# Patient Record
Sex: FEMALE | Race: WHITE | NOT HISPANIC OR LATINO | Employment: FULL TIME | URBAN - NONMETROPOLITAN AREA
[De-identification: names, ages, dates, MRNs, and addresses within clinical notes are randomized per-mention and may not be internally consistent; named-entity substitution may affect disease eponyms.]

---

## 2017-04-12 DIAGNOSIS — I51.7 DILATED VENTRICLE: Primary | ICD-10-CM

## 2017-04-12 DIAGNOSIS — R06.02 SHORTNESS OF BREATH: ICD-10-CM

## 2017-05-22 LAB
BH CV ECHO MEAS - EDV(CUBED): 79.5 ML
BH CV ECHO MEAS - EDV(TEICH): 83.1 ML
BH CV ECHO MEAS - EF(CUBED): 69.3 %
BH CV ECHO MEAS - EF(TEICH): 61.2 %
BH CV ECHO MEAS - ESV(CUBED): 24.4 ML
BH CV ECHO MEAS - ESV(TEICH): 32.2 ML
BH CV ECHO MEAS - FS: 32.6 %
BH CV ECHO MEAS - IVS/LVPW: 1
BH CV ECHO MEAS - IVSD: 0.8 CM
BH CV ECHO MEAS - LA DIMENSION: 3.5 CM
BH CV ECHO MEAS - LV MASS(C)D: 105.3 GRAMS
BH CV ECHO MEAS - LVIDD: 4.3 CM
BH CV ECHO MEAS - LVIDS: 2.9 CM
BH CV ECHO MEAS - LVPWD: 0.8 CM
BH CV ECHO MEAS - PA MAX PG: 4.2 MMHG
BH CV ECHO MEAS - PA V2 MAX: 102 CM/SEC
BH CV ECHO MEAS - PI END-D VEL: 82.5 CM/SEC
BH CV ECHO MEAS - RAP SYSTOLE: 5 MMHG
BH CV ECHO MEAS - RVDD: 2.2 CM
BH CV ECHO MEAS - RVSP: 22.1 MMHG
BH CV ECHO MEAS - SV(CUBED): 55.1 ML
BH CV ECHO MEAS - SV(TEICH): 50.9 ML
BH CV ECHO MEAS - TR MAX VEL: 207 CM/SEC

## 2017-06-02 DIAGNOSIS — R06.00 DYSPNEA, UNSPECIFIED TYPE: Primary | ICD-10-CM

## 2017-06-05 ENCOUNTER — OFFICE VISIT (OUTPATIENT)
Dept: CARDIOLOGY | Facility: CLINIC | Age: 27
End: 2017-06-05

## 2017-06-05 VITALS
WEIGHT: 146 LBS | DIASTOLIC BLOOD PRESSURE: 70 MMHG | BODY MASS INDEX: 29.43 KG/M2 | SYSTOLIC BLOOD PRESSURE: 110 MMHG | HEIGHT: 59 IN | HEART RATE: 82 BPM | OXYGEN SATURATION: 99 %

## 2017-06-05 DIAGNOSIS — Q20.8 ABNORMALITY OF RIGHT VENTRICLE OF HEART: ICD-10-CM

## 2017-06-05 DIAGNOSIS — R00.2 PALPITATIONS: ICD-10-CM

## 2017-06-05 DIAGNOSIS — I95.1 ORTHOSTATIC HYPOTENSION: Primary | ICD-10-CM

## 2017-06-05 PROCEDURE — 99214 OFFICE O/P EST MOD 30 MIN: CPT | Performed by: INTERNAL MEDICINE

## 2017-06-05 NOTE — PROGRESS NOTES
Cardiovascular Medicine   Joby Gamble M.D., Ph.D., [facility]              History of Present Illness        1. CP  A. Low-risk TST  B. TTE with RV dilation  2. Dyspnea  A. RV dilation  3. OH  A. NELA abnormal    Chest pain  She has had a TTE that was essentially normal. She had a low-risk TST. She tells me she is having pin point and focal pain at times that is epigastric. No other symptoms. She also tells me that migraines are often associated with these pains. Often times her pain radiates up into her head. She had an appt with neuro, but cancelled.     RV dilation  The patient was having dyspnea so she was sent for a TTE.  This showed likely severely dilated right ventricle.  She does complain of dyspnea. She underwent a ANNALEE. This showed no abnormalities. A repeat TTE was performed. RV is normal size.  She states she's doing well.    OH  Patient's tilt table was abnormal.  She's been using standardized precautions.  She's really been asymptomatic.  She's had no evidence of syncope.    Review of Systems - History obtained from chart review and the patient  General ROS: negative  Cardiovascular ROS: no chest pain or dyspnea on exertion    ROS     reports that she has never smoked. She has never used smokeless tobacco. She reports that she drinks alcohol. She reports that she does not use illicit drugs.    No Known Allergies        Physical Exam:  Vitals:    06/05/17 1447   BP: 110/70   Pulse: 82   SpO2: 99%     Body mass index is 29.49 kg/(m^2).    GEN: alert, appears stated age and cooperative  Body Habitus: obese  HEENT: Head: Normocephalic, no lesions, without obvious abnormality.  Neck / Thyroid: Supple, no masses, nodes, nodules or enlargement. No arcus senilis, xanthelasma or xanthomas.   JVP: 6 cm of water at 45 degrees HJR:      Carotid:  Upstroke: Brisk.  Volume: Normal.    Carotid Bruit:  None  Subclavian Bruit: Not present.    Chest:  Normal Excursion: Normal.  I:E: Normal  Pulmonary:clear to  auscultation, no wheezes, rales or rhonchi, symmetric air entry      Precordium:  No palpable heaves or thrusts. P2 is not palpable.   Erie:  normal size and placement Palpable S4: Not present.   Heart rate: normal  Heart Rhythm: regular     Heart Sounds: S1: normal intensity  S2: normal intensity  S3: absent   S4: absent  Opening Snap: absent  A2-OS:  N/A  Pericardial rub: absent    Ejection click: None      Murmurs: Systolic: Absent  Diastolic: absent  Extremity: no edema, cyanosis  Trophic changes: None   Pallor on elevation: Absent.    Rubor on dependency: None  Pulses: Right radial artery has 2+ (normal) pulse and Left radial artery has 2+ (normal) pulse    DATA REVIEWED:   EKG: Sinus  TST, low-risk  Holter, 2016  1. Numerous episodes of artifact. Recommend repeat study. 2. Sinus mechanism with normal average heart rate. 3. Normal burden of supraventricular ectopic events. 4. Symptoms correlated only with sinus mechanism.     TTE, 2016  1. Normal left ventricular function with an estimated ejection fraction of 65% without regional wall motion abnormalities. 2. Likely severely dilated right ventricle with impaired systolic function. 3. Normal diastolic function. 4. No significant valvular regurgitation or stenosis.     [facility] HEART AND VASCULAR  [facility] [address] [place] [address] [phone]             Irene Collins   Acquired echocardiogram 2D limited doppler, and color flow   Order# [id number]   Reading physician: Joby Gamble MD PhD Ordering physician: Joby Gamble MD PhD Study date: 17   Patient Information   Patient Name MRN Sex  (Age)   Irene Collins [id number] Female 1990 (27 y.o.)   Sedation Narrator Report   Sedation Narrator Report   Interpretation Summary   · Limited 2D echocardiogram  · Left Ventricle: Estimated EF appears to be in the range of 61 - 65%.  · Right Ventricle: Normal right ventricular cavity size, wall  thickness, systolic function and septal motion noted  · No evidence of pulmonary hypertension is present.  · There is no evidence of pericardial effusion.       Assessment/Plan     1. OH:  Because of the patient's postural hypotension and orthostasis, the following information was provided :    *Avoid standing for long periods. If you must stand, make sure that you keep your feet moving to help increase circulation  *Get up slowly from either sitting or lying down (this is especially important when you are getting out of the bath)  *Eat regularly. Avoid long periods between meals; it is better to snack throughout the day  *Avoid hot baths or showers  *Wear loose, comfortable clothing to avoid restricting circulation    If the patient were to feel faint, the following was provided:  sit or lie down and lower your head  take deep breaths  loosen any tight clothing  open windows and move towards circulating air  eat foods rich in iron    2.  Increased cardiac awareness without arrhythmia.  Reassurance    3. Non-cardiac CP.   -Follow-up PCP    4. RV dilation. ANNALEE and repeat TTE showed normal dimensions.  Reassurance    5.  Cardiac risk assessment: Low  No current indication for aspirin or statin    6.  Tobacco status: Patient's a nonsmoker    7. Possible KAY. She had a sleep study in [place]. She has not followed up.   -Recommended she see Dr. Aj      Follow-up: PCP

## 2017-07-21 ENCOUNTER — OFFICE VISIT (OUTPATIENT)
Dept: FAMILY MEDICINE CLINIC | Facility: CLINIC | Age: 27
End: 2017-07-21

## 2017-07-21 ENCOUNTER — APPOINTMENT (OUTPATIENT)
Dept: LAB | Facility: HOSPITAL | Age: 27
End: 2017-07-21

## 2017-07-21 VITALS
WEIGHT: 139 LBS | HEIGHT: 59 IN | BODY MASS INDEX: 28.02 KG/M2 | SYSTOLIC BLOOD PRESSURE: 130 MMHG | DIASTOLIC BLOOD PRESSURE: 80 MMHG | OXYGEN SATURATION: 99 % | HEART RATE: 104 BPM | TEMPERATURE: 99 F

## 2017-07-21 DIAGNOSIS — R30.0 DYSURIA: Primary | ICD-10-CM

## 2017-07-21 DIAGNOSIS — B37.2 CUTANEOUS CANDIDIASIS: ICD-10-CM

## 2017-07-21 LAB
BILIRUB BLD-MCNC: ABNORMAL MG/DL
CLARITY, POC: ABNORMAL
COLOR UR: ABNORMAL
GLUCOSE UR STRIP-MCNC: NEGATIVE MG/DL
KETONES UR QL: ABNORMAL
LEUKOCYTE EST, POC: NEGATIVE
NITRITE UR-MCNC: NEGATIVE MG/ML
PH UR: 5 [PH] (ref 5–8)
PROT UR STRIP-MCNC: ABNORMAL MG/DL
RBC # UR STRIP: ABNORMAL /UL
SP GR UR: 1.03 (ref 1–1.03)
UROBILINOGEN UR QL: NORMAL

## 2017-07-21 PROCEDURE — 81002 URINALYSIS NONAUTO W/O SCOPE: CPT | Performed by: FAMILY MEDICINE

## 2017-07-21 PROCEDURE — 99213 OFFICE O/P EST LOW 20 MIN: CPT | Performed by: FAMILY MEDICINE

## 2017-07-21 PROCEDURE — 87086 URINE CULTURE/COLONY COUNT: CPT | Performed by: FAMILY MEDICINE

## 2017-07-21 RX ORDER — SULFAMETHOXAZOLE AND TRIMETHOPRIM 800; 160 MG/1; MG/1
1 TABLET ORAL 2 TIMES DAILY
Qty: 6 TABLET | Refills: 0 | Status: SHIPPED | OUTPATIENT
Start: 2017-07-21 | End: 2017-09-29

## 2017-07-21 RX ORDER — PHENAZOPYRIDINE HYDROCHLORIDE 200 MG/1
200 TABLET, FILM COATED ORAL 3 TIMES DAILY PRN
Qty: 6 TABLET | Refills: 0 | Status: SHIPPED | OUTPATIENT
Start: 2017-07-21 | End: 2017-09-29

## 2017-07-21 RX ORDER — NYSTATIN 100000 [USP'U]/G
POWDER TOPICAL 3 TIMES DAILY
Qty: 30 G | Refills: 1 | Status: SHIPPED | OUTPATIENT
Start: 2017-07-21 | End: 2017-09-29

## 2017-07-21 NOTE — PROGRESS NOTES
Subjective:     WALK-IN    Chief Complaint:   Chief Complaint   Patient presents with   • Fever     place on bottom   • Headache     Irene Collins is a 27 y.o. female former patient of Dr Granados who presents with fever and a rash on her buttocks.  She had temperature of 102.5 yesterday morning at 9:00 am, took Tylenol x 4, then had subjective fever last night and took 2 Tylenol's.  She did not check her temperature today, but feels slightly warm.  She has also had symptoms or dysuria since  (), specifically pain and burning with urination, as well as frequency.  Denies flank/back pain.  With regards to the rash, she has had it since .  [place] and noticed it after that.  She has not noticed any bleeding or purulent drainage.  Mild headache on Monday, has been asymptomatic since.    Past Medical Hx:  Past Medical History:   Diagnosis Date   • Adjustment disorder with depressed mood     Adjustment disorder with depressed mood - on celexa and 1 week risperdal   • Costal chondritis    • Cough     Cough - improved   • Hand eczema    • Influenza    • Not vaccinated against influenza 2016    INFLUENZA IMMUN NOT ADMIN, REASON NOT DOC  (1) - CHERI GRANADOS ([facility])    • Rib pain    • Stress     Difficulty managing stress   • Upper respiratory infection        Past Surgical Hx:  Past Surgical History:   Procedure Laterality Date   •  SECTION       Section (2)   • CHOLECYSTECTOMY      Cholecystectomy (1)   • CHOLECYSTECTOMY      Cholecystectomy, laparoscopic (1)   • CYSTOSCOPY      Cystoscopy (1)   • CYSTOSCOPY      Cystoscopy & treatment (1)   • DENTAL PROCEDURE      Dental surgery procedure (1) - wisdom teeth       Health Maintenance:  Health Maintenance   Topic Date Due   • TDAP/TD VACCINES (1 - Tdap) 2017 (Originally 2009)   • INFLUENZA VACCINE  2017   • PAP SMEAR  2019       Current Meds:    Current Outpatient  Prescriptions on File Prior to Visit   Medication Sig   • levonorgestrel (MIRENA, 52 MG,) 20 MCG/24HR IUD 1 each by Intrauterine route 1 (one) time.   • B Complex-C (SUPER B COMPLEX PO) Take  by mouth.     No current facility-administered medications on file prior to visit.      Allergies:  Review of patient's allergies indicates no known allergies.    Family Hx:  Family History   Problem Relation Age of Onset   • ADD / ADHD Mother    • Diabetes Mother    • Anxiety disorder Mother    • ADD / ADHD Brother    • Heart murmur Brother    • Diabetes Maternal Grandmother    • Depression Other      Depressive disorder   • Heart disease Other    • Retinitis pigmentosa Other    • Arrhythmia Other    • Breast cancer Neg Hx    • Colon cancer Neg Hx      Colorectal cancer   • Endometrial cancer Neg Hx    • Ovarian cancer Neg Hx         Social History:  Social History     Social History   • Marital status:      Spouse name: N/A   • Number of children: N/A   • Years of education: N/A     Occupational History   • Not on file.     Social History Main Topics   • Smoking status: Never Smoker   • Smokeless tobacco: Never Used   • Alcohol use Yes      Comment: rare   • Drug use: No   • Sexual activity: Defer      Comment:      Other Topics Concern   • Not on file     Social History Narrative       Review of Systems  Review of Systems   Constitutional: Negative for chills and fever.   HENT: Negative for facial swelling and tinnitus.    Eyes: Negative for discharge and itching.   Respiratory: Negative for apnea and chest tightness.    Cardiovascular: Negative for chest pain and palpitations.   Gastrointestinal: Negative for abdominal distention and abdominal pain.   Endocrine: Negative for cold intolerance and heat intolerance.   Genitourinary: Positive for dysuria and frequency. Negative for difficulty urinating and vaginal discharge.   Musculoskeletal: Negative for arthralgias and back pain.   Skin: Positive for color  "change. Negative for pallor.   Neurological: Positive for headaches. Negative for syncope and speech difficulty.   Psychiatric/Behavioral: Negative for agitation and self-injury.     Objective:     /80  Pulse 104  Temp 99 °F (37.2 °C)  Ht 59\" (149.9 cm)  Wt 139 lb (63 kg)  SpO2 99%  BMI 28.07 kg/m2    General:  alert, appears stated age and cooperative   Oropharynx: lips, mucosa, and tongue normal; teeth and gums normal    Eyes:  conjunctivae/corneas clear. PERRL, EOM's intact. Fundi benign.    Ears:  normal TM's and external ear canals both ears   Neck: no adenopathy, no carotid bruit, no JVD, supple, symmetrical, trachea midline and thyroid not enlarged, symmetric, no tenderness/mass/nodules   Thyroid:  no palpable nodule   Lung: clear to auscultation bilaterally   Heart:  regular rate and rhythm, S1, S2 normal, no murmur, click, rub or gallop   Abdomen: soft, non-tender; bowel sounds normal; no masses,  no organomegaly   Extremities: extremities normal, atraumatic, no cyanosis or edema   Skin: Approx 4 x 4 cm erythematous macular rash on R medial buttock.  No firmness noted.   Pulses: 2+ and symmetric   Neuro: normal without focal findings, mental status, speech normal, alert and oriented x3     Office Visit on 07/21/2017   Component Date Value Ref Range Status   • Color 07/21/2017 Dark Yellow  Yellow, Straw, Dark Yellow, Nadja Final   • Clarity, UA 07/21/2017 Cloudy* Clear Final   • Glucose, UA 07/21/2017 Negative  Negative, 1000 mg/dL (3+) mg/dL Final   • Bilirubin 07/21/2017 Large (3+)* Negative Final   • Ketones, UA 07/21/2017 Trace* Negative Final   • Specific Gravity  07/21/2017 1.030  1.005 - 1.030 Final   • Blood, UA 07/21/2017 Large* Negative Final   • pH, Urine 07/21/2017 5.0  5.0 - 8.0 Final   • Protein, POC 07/21/2017 2+* Negative mg/dL Final   • Urobilinogen, UA 07/21/2017 Normal  Normal Final   • Leukocytes 07/21/2017 Negative  Negative Final   • Nitrite, UA 07/21/2017 Negative  Negative " Final   • Urine Culture 07/21/2017 No growth at 24 hours   Final        Assessment/Plan:     Allegra was seen today for fever and headache.    Diagnoses and all orders for this visit:    Dysuria  -     POCT urinalysis dipstick, manual  -     Follow-up Urine Culture  -     sulfamethoxazole-trimethoprim (BACTRIM DS) 800-160 MG per tablet; Take 1 tablet by mouth 2 (Two) Times a Day.  -     phenazopyridine (PYRIDIUM) 200 MG tablet; Take 1 tablet by mouth 3 (Three) Times a Day As Needed for bladder spasms.        - Afebrile here, discussed fever curve and that she will likely continue to improve, but if worsens over the weekend she should seek medical attention.    Cutaneous candidiasis  -     nystatin (MYCOSTATIN) 514504 UNIT/GM powder; Apply  topically 3 (Three) Times a Day.    Return in about 1 month (around 8/21/2017) for Recheck, establish care Dr Puri.  Advised that she can follow-up with me if desired, but she would rather see Hien Puri MD due to longer continuity of care.    Preventative:  Tdap deferred due to acute illness.  Pap discussed, patient would [facility] to [facility], she had Mirena placed last year by [facility], and she was supposed to follow-up in 1 year.  Advised to make appointment with them.    RISK SCORE: 4          This document has been electronically signed by Chito Milligan MD on July 23, 2017 1:41 PM

## 2017-07-23 LAB — BACTERIA SPEC AEROBE CULT: NORMAL

## 2017-07-24 NOTE — PROGRESS NOTES
I have reviewed the notes, assessments, and/or procedures performed by Dr. Milligan, I concur with her/his documentation of Irene Collins.

## 2017-09-29 ENCOUNTER — APPOINTMENT (OUTPATIENT)
Dept: LAB | Facility: HOSPITAL | Age: 27
End: 2017-09-29

## 2017-09-29 ENCOUNTER — PROCEDURE VISIT (OUTPATIENT)
Dept: OBSTETRICS AND GYNECOLOGY | Facility: CLINIC | Age: 27
End: 2017-09-29

## 2017-09-29 VITALS
SYSTOLIC BLOOD PRESSURE: 122 MMHG | BODY MASS INDEX: 29.64 KG/M2 | WEIGHT: 147 LBS | DIASTOLIC BLOOD PRESSURE: 82 MMHG | HEIGHT: 59 IN

## 2017-09-29 DIAGNOSIS — Z11.3 ENCOUNTER FOR SCREENING FOR INFECTIONS WITH PREDOMINANTLY SEXUAL MODE OF TRANSMISSION: Primary | ICD-10-CM

## 2017-09-29 DIAGNOSIS — Z30.431 IUD CHECK UP: ICD-10-CM

## 2017-09-29 DIAGNOSIS — Z32.00 PREGNANCY EXAMINATION OR TEST, PREGNANCY UNCONFIRMED: ICD-10-CM

## 2017-09-29 LAB
CANDIDA ALBICANS: NEGATIVE
GARDNERELLA VAGINALIS: NEGATIVE
HCG INTACT+B SERPL-ACNC: <2.4 MIU/ML
TRICHOMONAS VAGINALIS PCR: NEGATIVE

## 2017-09-29 PROCEDURE — 87660 TRICHOMONAS VAGIN DIR PROBE: CPT | Performed by: NURSE PRACTITIONER

## 2017-09-29 PROCEDURE — 87491 CHLMYD TRACH DNA AMP PROBE: CPT | Performed by: NURSE PRACTITIONER

## 2017-09-29 PROCEDURE — 87591 N.GONORRHOEAE DNA AMP PROB: CPT | Performed by: NURSE PRACTITIONER

## 2017-09-29 PROCEDURE — 36415 COLL VENOUS BLD VENIPUNCTURE: CPT | Performed by: NURSE PRACTITIONER

## 2017-09-29 PROCEDURE — 99213 OFFICE O/P EST LOW 20 MIN: CPT | Performed by: NURSE PRACTITIONER

## 2017-09-29 PROCEDURE — 87510 GARDNER VAG DNA DIR PROBE: CPT | Performed by: NURSE PRACTITIONER

## 2017-09-29 PROCEDURE — 84702 CHORIONIC GONADOTROPIN TEST: CPT | Performed by: NURSE PRACTITIONER

## 2017-09-29 PROCEDURE — 87480 CANDIDA DNA DIR PROBE: CPT | Performed by: NURSE PRACTITIONER

## 2017-09-29 NOTE — PROGRESS NOTES
"Subjective   History of Present Illness    Irene Collins is a 27 y.o. female who presents for IUD check, STI testing, and pregnancy test. She does not feel her strings at home. She has had fatigue, nausea, and headaches recently, and concerned that she may be pregnant. She has had current mirena IUD since 3/18/14. She has had 1 new sexual partner and not aware of his sexual history.    Current contraception: Mirena IUD  Sexually active?: Yes  Postmenopausal?: No  HRT?: no  Hysterectomy?: no    Date last pap:   History of abnormal Pap smear: no    /82  Ht 59\" (149.9 cm)  Wt 147 lb (66.7 kg)  LMP 2009 (Approximate)  Breastfeeding? No  BMI 29.69 kg/m2    Past Medical History:   Diagnosis Date   • Adjustment disorder with depressed mood     Adjustment disorder with depressed mood - on celexa and 1 week risperdal   • Costal chondritis    • Cough     Cough - improved   • Hand eczema    • Influenza    • Not vaccinated against influenza 2016    INFLUENZA IMMUN NOT ADMIN, REASON NOT DOC  (1) - CHERI GRANADOS ([facility])    • Rib pain    • Stress     Difficulty managing stress   • Upper respiratory infection        Past Surgical History:   Procedure Laterality Date   •  SECTION       Section (2)   • CHOLECYSTECTOMY      Cholecystectomy (1)   • CHOLECYSTECTOMY      Cholecystectomy, laparoscopic (1)   • CYSTOSCOPY      Cystoscopy (1)   • CYSTOSCOPY      Cystoscopy & treatment (1)   • DENTAL PROCEDURE      Dental surgery procedure (1) - wisdom teeth       The following portions of the patient's history were reviewed and updated as appropriate: allergies, current medications, past family history, past medical history, past social history, past surgical history and problem list.    Review of Systems    Constitutional:  No fatigue, No weight loss, No weight gain, No fever and No chills   Respiratory: No dyspnea, No cough, No hemopytysis, No wheezing and No pleuritic pain   Cardiovascular: " No chest pain, No palpitations, No arrhythmia, No orthopnea, No noctural dyspnea, No edema and No claudication   Breasts: No discharge from nipple, No breast tenderness and No breast mass   Gastrointestinal: No loss of appetite, No dysphagia, No abdominal pain, No nausea, No vomiting, No change in bowel habits, No diarrhea, No constipation and No blood in stool   Genitourinary: No increased frequency of urination, No dysuria, No hematuria, No nocturia, No urinary incontinence, No vaginal discharge, No abnormal vaginal bleeding, No pelvic pain, No menstrual problem and No menopausal problem   Skin: No skin rash, No skin lesion, No dry skin, No pruritus and No nail problem   Neurologic: No headache, No dizziness, No lightheadedness, No syncope, No vertigo, No weakness, No numbness, No tremor and No paresthesia   Psychiatric: No difficulty sleeping, No mood swings, No feeling anxious, No confusion and No memory loss          Objective   Physical Exam    General:  Alert and oriented x 3, Cooperative, Well developed & well nourished and No acute distress   Genitourinary: Vulva normal, vaginal mucosa normal, bladder nondistended and nontender, cervix normal, uterus normal size and nontender, no adnexal/pelvic tenderness or masses, Bartholin's/Tangier/Urethral gland WNL, IUD strings noted extruding from os   Skin: Skin warm and dry and Pattern of hair growth normal       Assessment/Plan   Allegra was seen today for fatigue.    Diagnoses and all orders for this visit:    Encounter for screening for infections with predominantly sexual mode of transmission  -     Chlamydia trachomatis, Neisseria gonorrhoeae, PCR - Swab, Vagina  -     Gardnerella vaginalis, Trichomonas vaginalis, Candida albicans, PCR - Swab, Vagina    Pregnancy examination or test, pregnancy unconfirmed  -     hCG, Quantitative, Pregnancy    IUD check up      All questions answered.  Labs today.  Will rule out vaginal infections.  Discussed contraception  methods, including risks/side effects/benefits.  Follow up in 1 year.

## 2017-10-01 LAB
C TRACH RRNA CVX QL NAA+PROBE: NOT DETECTED
N GONORRHOEA RRNA SPEC QL NAA+PROBE: DETECTED

## 2017-10-02 ENCOUNTER — TELEPHONE (OUTPATIENT)
Dept: OBSTETRICS AND GYNECOLOGY | Facility: CLINIC | Age: 27
End: 2017-10-02

## 2017-10-02 NOTE — TELEPHONE ENCOUNTER
Informed pt of +gonorrhea over phone. Patient to return to office for rocephin injection. Advised pt to have partner treated by PCP/health department.

## 2017-10-03 ENCOUNTER — CLINICAL SUPPORT (OUTPATIENT)
Dept: OBSTETRICS AND GYNECOLOGY | Facility: CLINIC | Age: 27
End: 2017-10-03

## 2017-10-03 DIAGNOSIS — A54.9 GONORRHEA: Primary | ICD-10-CM

## 2017-10-03 PROCEDURE — 96372 THER/PROPH/DIAG INJ SC/IM: CPT | Performed by: NURSE PRACTITIONER

## 2017-10-03 RX ORDER — CEFTRIAXONE SODIUM 250 MG/1
250 INJECTION, POWDER, FOR SOLUTION INTRAMUSCULAR; INTRAVENOUS EVERY 24 HOURS
Status: DISCONTINUED | OUTPATIENT
Start: 2017-10-03 | End: 2018-02-12

## 2017-10-03 RX ADMIN — CEFTRIAXONE SODIUM 250 MG: 250 INJECTION, POWDER, FOR SOLUTION INTRAMUSCULAR; INTRAVENOUS at 09:21

## 2017-12-21 PROCEDURE — 88142 CYTOPATH C/V THIN LAYER: CPT | Performed by: NURSE PRACTITIONER

## 2017-12-21 PROCEDURE — 88141 CYTOPATH C/V INTERPRET: CPT | Performed by: PATHOLOGY

## 2017-12-21 PROCEDURE — 87624 HPV HI-RISK TYP POOLED RSLT: CPT | Performed by: NURSE PRACTITIONER

## 2017-12-26 ENCOUNTER — LAB REQUISITION (OUTPATIENT)
Dept: LAB | Facility: HOSPITAL | Age: 27
End: 2017-12-26

## 2017-12-26 DIAGNOSIS — Z01.419 ENCOUNTER FOR GYNECOLOGICAL EXAMINATION WITHOUT ABNORMAL FINDING: ICD-10-CM

## 2017-12-28 LAB
LAB AP CASE REPORT: ABNORMAL
LAB AP GYN ADDITIONAL INFORMATION: ABNORMAL
LAB AP GYN OTHER FINDINGS: ABNORMAL
LAB AP LMP: ABNORMAL
Lab: ABNORMAL
PATH INTERP SPEC-IMP: ABNORMAL
STAT OF ADQ CVX/VAG CYTO-IMP: ABNORMAL

## 2017-12-31 LAB — HPV I/H RISK 4 DNA CVX QL PROBE+SIG AMP: NEGATIVE

## 2018-01-29 ENCOUNTER — LAB REQUISITION (OUTPATIENT)
Dept: LAB | Facility: HOSPITAL | Age: 28
End: 2018-01-29

## 2018-01-29 DIAGNOSIS — Z01.419 ENCOUNTER FOR GYNECOLOGICAL EXAMINATION WITHOUT ABNORMAL FINDING: ICD-10-CM

## 2019-01-07 PROCEDURE — G0123 SCREEN CERV/VAG THIN LAYER: HCPCS | Performed by: NURSE PRACTITIONER

## 2019-01-07 PROCEDURE — 88141 CYTOPATH C/V INTERPRET: CPT | Performed by: PATHOLOGY

## 2019-01-09 ENCOUNTER — LAB REQUISITION (OUTPATIENT)
Dept: LAB | Facility: HOSPITAL | Age: 29
End: 2019-01-09

## 2019-01-09 DIAGNOSIS — Z01.419 ENCOUNTER FOR GYNECOLOGICAL EXAMINATION WITHOUT ABNORMAL FINDING: ICD-10-CM

## 2019-01-10 LAB
GEN CATEG CVX/VAG CYTO-IMP: NORMAL
LAB AP CASE REPORT: NORMAL
LAB AP GYN ADDITIONAL INFORMATION: NORMAL
LAB AP GYN OTHER FINDINGS: NORMAL
LAB AP LMP: NORMAL
PATH INTERP SPEC-IMP: NORMAL
STAT OF ADQ CVX/VAG CYTO-IMP: NORMAL

## 2019-03-19 ENCOUNTER — HOSPITAL ENCOUNTER (EMERGENCY)
Facility: HOSPITAL | Age: 29
Discharge: HOME | End: 2019-03-19
Attending: EMERGENCY MEDICINE | Admitting: EMERGENCY MEDICINE

## 2019-03-19 ENCOUNTER — APPOINTMENT (OUTPATIENT)
Dept: GENERAL RADIOLOGY | Facility: HOSPITAL | Age: 29
End: 2019-03-19

## 2019-03-19 VITALS
WEIGHT: 175 LBS | DIASTOLIC BLOOD PRESSURE: 102 MMHG | HEIGHT: 59 IN | SYSTOLIC BLOOD PRESSURE: 153 MMHG | HEART RATE: 112 BPM | OXYGEN SATURATION: 98 % | RESPIRATION RATE: 18 BRPM | TEMPERATURE: 97.5 F | BODY MASS INDEX: 35.28 KG/M2

## 2019-03-19 DIAGNOSIS — J06.9 URI, ACUTE: ICD-10-CM

## 2019-03-19 DIAGNOSIS — J40 BRONCHITIS: Primary | ICD-10-CM

## 2019-03-19 DIAGNOSIS — R05.9 COUGH: ICD-10-CM

## 2019-03-19 LAB
FLUAV AG NPH QL: NEGATIVE
FLUBV AG NPH QL IA: NEGATIVE

## 2019-03-19 PROCEDURE — 99283 EMERGENCY DEPT VISIT LOW MDM: CPT

## 2019-03-19 PROCEDURE — 87804 INFLUENZA ASSAY W/OPTIC: CPT | Performed by: EMERGENCY MEDICINE

## 2019-03-19 PROCEDURE — 71046 X-RAY EXAM CHEST 2 VIEWS: CPT

## 2019-03-19 RX ORDER — ALBUTEROL SULFATE 90 UG/1
2 AEROSOL, METERED RESPIRATORY (INHALATION) EVERY 6 HOURS PRN
Qty: 1 INHALER | Refills: 0 | Status: SHIPPED | OUTPATIENT
Start: 2019-03-19 | End: 2019-07-30

## 2019-03-19 RX ORDER — PREDNISONE 20 MG/1
20 TABLET ORAL 2 TIMES DAILY
Qty: 10 TABLET | Refills: 0 | Status: SHIPPED | OUTPATIENT
Start: 2019-03-19 | End: 2019-03-24

## 2019-03-19 NOTE — ED PROVIDER NOTES
Subjective   Patient presents to emergency department for cough, congestion, runny nose x 1.5 weeks.  Denies smoking tobacco.  But her boyfriend smoke in the house.  States son has had an intermittent cough x 2 months but seems like it has gotten better. States he has had a cough for a few months.  Her boyfriend is also sick with similar symptoms.  States she was diagnosed with bronchitis and given cough syrup, and steroid, and an antibiotic by urgent care.  Also states she has thrombocytosis.          History provided by:  Patient   used: No    Flu Symptoms   Presenting symptoms: cough, fatigue, fever and rhinorrhea    Presenting symptoms: no diarrhea, no headaches, no nausea, no shortness of breath, no sore throat and no vomiting    Severity:  Moderate  Onset quality:  Sudden  Duration:  10 days  Progression:  Worsening  Chronicity:  New  Associated symptoms: chills and nasal congestion    Risk factors: sick contacts    Risk factors: no immunocompromised state        Review of Systems   Constitutional: Positive for chills, fatigue and fever.   HENT: Positive for congestion and rhinorrhea. Negative for sore throat and trouble swallowing.    Eyes: Negative for visual disturbance.   Respiratory: Positive for cough and wheezing. Negative for shortness of breath.    Cardiovascular: Positive for chest pain (when she coughs).   Gastrointestinal: Negative for abdominal pain, diarrhea, nausea and vomiting.   Genitourinary: Negative for dysuria and flank pain.   Skin: Negative for color change.   Allergic/Immunologic: Negative for immunocompromised state.   Neurological: Negative for headaches.   Hematological: Does not bruise/bleed easily.   Psychiatric/Behavioral: Negative for confusion.       Past Medical History:   Diagnosis Date   • Adjustment disorder with depressed mood     Adjustment disorder with depressed mood - on celexa and 1 week risperdal   • Costal chondritis    • Cough     Cough -  "improved   • Hand eczema    • Influenza    • Not vaccinated against influenza 2016    INFLUENZA IMMUN NOT ADMIN, REASON NOT DOC  (1) - CHERI GRANADOS ([facility])    • Rib pain    • Stress     Difficulty managing stress   • Upper respiratory infection        No Known Allergies    Past Surgical History:   Procedure Laterality Date   •  SECTION       Section (2)   • CHOLECYSTECTOMY      Cholecystectomy (1)   • CHOLECYSTECTOMY      Cholecystectomy, laparoscopic (1)   • CYSTOSCOPY      Cystoscopy (1)   • CYSTOSCOPY      Cystoscopy & treatment (1)   • DENTAL PROCEDURE      Dental surgery procedure (1) - wisdom teeth       Family History   Problem Relation Age of Onset   • ADD / ADHD Mother    • Diabetes Mother    • Anxiety disorder Mother    • ADD / ADHD Brother    • Heart murmur Brother    • Diabetes Maternal Grandmother    • Depression Other         Depressive disorder   • Heart disease Other    • Retinitis pigmentosa Other    • Arrhythmia Other    • Breast cancer Neg Hx    • Colon cancer Neg Hx         Colorectal cancer   • Endometrial cancer Neg Hx    • Ovarian cancer Neg Hx        Social History     Socioeconomic History   • Marital status:      Spouse name: Not on file   • Number of children: Not on file   • Years of education: Not on file   • Highest education level: Not on file   Tobacco Use   • Smoking status: Never Smoker   • Smokeless tobacco: Never Used   Substance and Sexual Activity   • Alcohol use: Yes     Comment: rare   • Drug use: No   • Sexual activity: Yes     Partners: Male     Birth control/protection: Implant     Comment:    Social History Narrative    ** Merged History Encounter **                Objective      BP (!) 153/102 (Patient Position: Sitting)   Pulse 112   Temp 97.5 °F (36.4 °C) (Oral)   Resp 18   Ht 149.9 cm (59\")   Wt 79.4 kg (175 lb)   SpO2 98%   BMI 35.35 kg/m²     Physical Exam   Constitutional: She is oriented to person, place, and time. She " appears well-developed and well-nourished. No distress.   HENT:   Head: Normocephalic and atraumatic.   Eyes: Conjunctivae are normal.   Cardiovascular: Regular rhythm, normal heart sounds and intact distal pulses.   Tachycardia     Pulmonary/Chest: Effort normal. No stridor. No respiratory distress. She has wheezes.   Persistent cough     Musculoskeletal: She exhibits no edema.   Neurological: She is alert and oriented to person, place, and time.   Skin: Skin is warm. Capillary refill takes less than 2 seconds.   Psychiatric: She has a normal mood and affect. Her behavior is normal. Thought content normal.   Nursing note and vitals reviewed.      Procedures           ED Course  ED Course as of Mar 19 1237   Tue Mar 19, 2019   1127 Discussed with patient that her symptoms are likely due to bronchitis.  Patient has antibiotic steroid and cough syrup.  Discussed likely course of symptoms and supportive treatment.  Advised patient that boyfriend should smoke outside to reduce worsening symptoms.  [BETHANY]      ED Course User Index  [BETHANY] Dustin Parikh PA-C      Results for orders placed or performed during the hospital encounter of 03/19/19   Influenza Antigen, Rapid - Swab, Nasopharynx   Result Value Ref Range    Influenza A Ag, EIA Negative Negative    Influenza B Ag, EIA Negative Negative     Xr Chest Pa & Lateral    Result Date: 3/19/2019  Narrative: Chest PA and lateral CLINICAL INDICATION: Shortness of breath. Cough COMPARISON: October 16, 2012. FINDINGS: Cardiac silhouette is normal in size. Pulmonary vascularity is unremarkable. No focal infiltrate or consolidation.  No pleural effusion.  No pneumothorax.     Impression: CONCLUSION: No evidence of active disease. Normal chest.  Electronically signed by:  Yong Barker MD  3/19/2019 11:11 AM CDT Workstation: [id number]      Discussed results with patient.  Gave educational materials.  Advised close follow up with PCP.  Return to emergency department for new or  worsening symptoms.          [facility]      Final diagnoses:   Bronchitis            Dustin Parikh, ERYN  03/19/19 1339

## 2019-04-09 ENCOUNTER — HOSPITAL ENCOUNTER (OUTPATIENT)
Dept: ULTRASOUND IMAGING | Facility: HOSPITAL | Age: 29
Discharge: HOME | End: 2019-04-09
Admitting: NURSE PRACTITIONER

## 2019-04-09 DIAGNOSIS — K76.0 FATTY LIVER: ICD-10-CM

## 2019-04-09 PROCEDURE — 76705 ECHO EXAM OF ABDOMEN: CPT

## 2019-05-01 ENCOUNTER — APPOINTMENT (OUTPATIENT)
Dept: GENERAL RADIOLOGY | Facility: HOSPITAL | Age: 29
End: 2019-05-01

## 2019-05-01 ENCOUNTER — HOSPITAL ENCOUNTER (EMERGENCY)
Facility: HOSPITAL | Age: 29
Discharge: HOME | End: 2019-05-01
Attending: EMERGENCY MEDICINE | Admitting: EMERGENCY MEDICINE

## 2019-05-01 VITALS
OXYGEN SATURATION: 95 % | DIASTOLIC BLOOD PRESSURE: 89 MMHG | SYSTOLIC BLOOD PRESSURE: 137 MMHG | TEMPERATURE: 98 F | HEART RATE: 86 BPM | RESPIRATION RATE: 20 BRPM

## 2019-05-01 DIAGNOSIS — B00.1 HERPES LABIALIS: Primary | ICD-10-CM

## 2019-05-01 DIAGNOSIS — J06.9 URI, ACUTE: ICD-10-CM

## 2019-05-01 DIAGNOSIS — R05.9 COUGH: ICD-10-CM

## 2019-05-01 PROCEDURE — 71046 X-RAY EXAM CHEST 2 VIEWS: CPT

## 2019-05-01 PROCEDURE — 99283 EMERGENCY DEPT VISIT LOW MDM: CPT

## 2019-05-01 PROCEDURE — 63710000001 PREDNISONE PER 1 MG: Performed by: PHYSICIAN ASSISTANT

## 2019-05-01 RX ORDER — VALACYCLOVIR HYDROCHLORIDE 1 G/1
2000 TABLET, FILM COATED ORAL 2 TIMES DAILY
Qty: 4 TABLET | Refills: 0 | Status: SHIPPED | OUTPATIENT
Start: 2019-05-01 | End: 2019-05-02

## 2019-05-01 RX ORDER — PREDNISONE 20 MG/1
20 TABLET ORAL ONCE
Status: COMPLETED | OUTPATIENT
Start: 2019-05-01 | End: 2019-05-01

## 2019-05-01 RX ORDER — PREDNISONE 20 MG/1
20 TABLET ORAL DAILY
Qty: 7 TABLET | Refills: 0 | Status: SHIPPED | OUTPATIENT
Start: 2019-05-01 | End: 2019-05-08

## 2019-05-01 RX ADMIN — PREDNISONE 20 MG: 20 TABLET ORAL at 10:57

## 2019-05-01 NOTE — ED NOTES
Patient stated that she is unable to swallow any water or solids, struggling to breath and cannot talk due to pain. Patient has a letter written to read about symptoms.       Drea Zendejas  05/01/19 1007

## 2019-05-01 NOTE — ED PROVIDER NOTES
Subjective   Pt reports lip pain and swelling started 3 days ago and now in the ED due to persistent symptoms. Pt reports recent teeth extraction () and dx with thrush. She is currently taking ABX and using magic mouthwash.         History provided by:  Patient   used: No        Review of Systems   Constitutional: Negative for fatigue.   HENT: Positive for mouth sores. Negative for congestion.    Respiratory: Positive for shortness of breath. Negative for cough.    Cardiovascular: Negative for chest pain.   Gastrointestinal: Negative for vomiting.   Endocrine: Negative for polyuria.   Neurological: Negative for syncope.   Psychiatric/Behavioral: Negative for agitation.   All other systems reviewed and are negative.      Past Medical History:   Diagnosis Date   • Adjustment disorder with depressed mood     Adjustment disorder with depressed mood - on celexa and 1 week risperdal   • Costal chondritis    • Cough     Cough - improved   • Hand eczema    • Influenza    • Not vaccinated against influenza 2016    INFLUENZA IMMUN NOT ADMIN, REASON NOT DOC  (1) - [facility] ROBERTA ([facility])    • Rib pain    • Stress     Difficulty managing stress   • Upper respiratory infection        No Known Allergies    Past Surgical History:   Procedure Laterality Date   •  SECTION       Section (2)   • CHOLECYSTECTOMY      Cholecystectomy (1)   • CHOLECYSTECTOMY      Cholecystectomy, laparoscopic (1)   • CYSTOSCOPY      Cystoscopy (1)   • CYSTOSCOPY      Cystoscopy & treatment (1)   • DENTAL PROCEDURE      Dental surgery procedure (1) - wisdom teeth       Family History   Problem Relation Age of Onset   • ADD / ADHD Mother    • Diabetes Mother    • Anxiety disorder Mother    • ADD / ADHD Brother    • Heart murmur Brother    • Diabetes Maternal Grandmother    • Depression Other         Depressive disorder   • Heart disease Other    • Retinitis pigmentosa Other    • Arrhythmia Other    • Breast cancer  Neg Hx    • Colon cancer Neg Hx         Colorectal cancer   • Endometrial cancer Neg Hx    • Ovarian cancer Neg Hx        Social History     Socioeconomic History   • Marital status:      Spouse name: Not on file   • Number of children: Not on file   • Years of education: Not on file   • Highest education level: Not on file   Tobacco Use   • Smoking status: Never Smoker   • Smokeless tobacco: Never Used   Substance and Sexual Activity   • Alcohol use: Yes     Comment: rare   • Drug use: No   • Sexual activity: Yes     Partners: Male     Birth control/protection: Implant     Comment:    Social History Narrative    ** Merged History Encounter **                Objective   Physical Exam   Constitutional: She is oriented to person, place, and time. She appears well-developed and well-nourished.   HENT:   Head: Normocephalic.   Right Ear: Hearing normal.   Left Ear: Hearing normal.   Nose: Nose normal.   Mouth/Throat:       Multiple mouth sores located on bottom lip consistent with herpes   Eyes: Conjunctivae, EOM and lids are normal.   Neck: Trachea normal and full passive range of motion without pain.   Cardiovascular: Regular rhythm, S1 normal, S2 normal, normal heart sounds and normal pulses.   Pulmonary/Chest: Effort normal and breath sounds normal.   Abdominal: Normal appearance and bowel sounds are normal.   Neurological: She is alert and oriented to person, place, and time. She is not disoriented.   Skin: Skin is warm and dry. She is not diaphoretic.   Psychiatric: She has a normal mood and affect. Her speech is normal and behavior is normal. Thought content normal.   Nursing note and vitals reviewed.      Procedures           ED Course                  MDM      Final diagnoses:   Herpes labialis            Rolanda Meza PA-C  05/01/19 1954

## 2019-05-01 NOTE — ED TRIAGE NOTES
Pt presents with blue color on tongue. Pt and visitor states she has been eating blue and green popcicles.

## 2019-05-04 ENCOUNTER — APPOINTMENT (OUTPATIENT)
Dept: GENERAL RADIOLOGY | Facility: HOSPITAL | Age: 29
End: 2019-05-04

## 2019-05-04 ENCOUNTER — HOSPITAL ENCOUNTER (EMERGENCY)
Facility: HOSPITAL | Age: 29
Discharge: HOME | End: 2019-05-04
Attending: FAMILY MEDICINE | Admitting: FAMILY MEDICINE

## 2019-05-04 ENCOUNTER — APPOINTMENT (OUTPATIENT)
Dept: CT IMAGING | Facility: HOSPITAL | Age: 29
End: 2019-05-04

## 2019-05-04 VITALS
DIASTOLIC BLOOD PRESSURE: 75 MMHG | OXYGEN SATURATION: 95 % | RESPIRATION RATE: 20 BRPM | TEMPERATURE: 98.5 F | BODY MASS INDEX: 35.28 KG/M2 | HEIGHT: 59 IN | SYSTOLIC BLOOD PRESSURE: 133 MMHG | WEIGHT: 175 LBS | HEART RATE: 107 BPM

## 2019-05-04 DIAGNOSIS — B00.1 HERPES LABIALIS: ICD-10-CM

## 2019-05-04 DIAGNOSIS — B37.0 ORAL THRUSH: ICD-10-CM

## 2019-05-04 DIAGNOSIS — R53.1 WEAKNESS: Primary | ICD-10-CM

## 2019-05-04 LAB
ALBUMIN SERPL-MCNC: 3.9 G/DL (ref 3.5–5.2)
ALBUMIN/GLOB SERPL: 1.2 G/DL
ALP SERPL-CCNC: 48 U/L (ref 39–117)
ALT SERPL W P-5'-P-CCNC: 41 U/L (ref 1–33)
AMPHET+METHAMPHET UR QL: NEGATIVE
ANION GAP SERPL CALCULATED.3IONS-SCNC: 15 MMOL/L
AST SERPL-CCNC: 35 U/L (ref 1–32)
B-HCG UR QL: NEGATIVE
BARBITURATES UR QL SCN: NEGATIVE
BASOPHILS # BLD AUTO: 0.06 10*3/MM3 (ref 0–0.2)
BASOPHILS NFR BLD AUTO: 0.6 % (ref 0–1.5)
BENZODIAZ UR QL SCN: NEGATIVE
BILIRUB SERPL-MCNC: 0.4 MG/DL (ref 0.2–1.2)
BILIRUB UR QL STRIP: ABNORMAL
BUN BLD-MCNC: 10 MG/DL (ref 6–20)
BUN/CREAT SERPL: 16.1 (ref 7–25)
CALCIUM SPEC-SCNC: 9 MG/DL (ref 8.6–10.5)
CANNABINOIDS SERPL QL: NEGATIVE
CHLORIDE SERPL-SCNC: 100 MMOL/L (ref 98–107)
CLARITY UR: ABNORMAL
CO2 SERPL-SCNC: 25 MMOL/L (ref 22–29)
COCAINE UR QL: NEGATIVE
COLOR UR: YELLOW
CREAT BLD-MCNC: 0.62 MG/DL (ref 0.57–1)
DEPRECATED RDW RBC AUTO: 43.5 FL (ref 37–54)
EOSINOPHIL # BLD AUTO: 0.2 10*3/MM3 (ref 0–0.4)
EOSINOPHIL NFR BLD AUTO: 2 % (ref 0.3–6.2)
ERYTHROCYTE [DISTWIDTH] IN BLOOD BY AUTOMATED COUNT: 13.2 % (ref 12.3–15.4)
GFR SERPL CREATININE-BSD FRML MDRD: 114 ML/MIN/1.73
GLOBULIN UR ELPH-MCNC: 3.2 GM/DL
GLUCOSE BLD-MCNC: 77 MG/DL (ref 65–99)
GLUCOSE BLDC GLUCOMTR-MCNC: 65 MG/DL (ref 70–130)
GLUCOSE BLDC GLUCOMTR-MCNC: 67 MG/DL (ref 70–130)
GLUCOSE BLDC GLUCOMTR-MCNC: 73 MG/DL (ref 70–130)
GLUCOSE UR STRIP-MCNC: NEGATIVE MG/DL
HCT VFR BLD AUTO: 42 % (ref 34–46.6)
HGB BLD-MCNC: 14 G/DL (ref 12–15.9)
HGB UR QL STRIP.AUTO: NEGATIVE
HOLD SPECIMEN: NORMAL
HOLD SPECIMEN: NORMAL
IMM GRANULOCYTES # BLD AUTO: 0.04 10*3/MM3 (ref 0–0.05)
IMM GRANULOCYTES NFR BLD AUTO: 0.4 % (ref 0–0.5)
INR PPP: 1.11 (ref 0.8–1.2)
KETONES UR QL STRIP: ABNORMAL
LEUKOCYTE ESTERASE UR QL STRIP.AUTO: NEGATIVE
LYMPHOCYTES # BLD AUTO: 2.37 10*3/MM3 (ref 0.7–3.1)
LYMPHOCYTES NFR BLD AUTO: 24 % (ref 19.6–45.3)
MCH RBC QN AUTO: 30.1 PG (ref 26.6–33)
MCHC RBC AUTO-ENTMCNC: 33.3 G/DL (ref 31.5–35.7)
MCV RBC AUTO: 90.3 FL (ref 79–97)
METHADONE UR QL SCN: NEGATIVE
MONOCYTES # BLD AUTO: 0.83 10*3/MM3 (ref 0.1–0.9)
MONOCYTES NFR BLD AUTO: 8.4 % (ref 5–12)
NEUTROPHILS # BLD AUTO: 6.36 10*3/MM3 (ref 1.7–7)
NEUTROPHILS NFR BLD AUTO: 64.6 % (ref 42.7–76)
NITRITE UR QL STRIP: NEGATIVE
NRBC BLD AUTO-RTO: 0 /100 WBC (ref 0–0.2)
OPIATES UR QL: NEGATIVE
OXYCODONE UR QL SCN: NEGATIVE
PH UR STRIP.AUTO: 5.5 [PH] (ref 5–9)
PLATELET # BLD AUTO: 335 10*3/MM3 (ref 140–450)
PMV BLD AUTO: 9.3 FL (ref 6–12)
POTASSIUM BLD-SCNC: 3.5 MMOL/L (ref 3.5–5.2)
PROT SERPL-MCNC: 7.1 G/DL (ref 6–8.5)
PROT UR QL STRIP: NEGATIVE
PROTHROMBIN TIME: 14.1 SECONDS (ref 11.1–15.3)
RBC # BLD AUTO: 4.65 10*6/MM3 (ref 3.77–5.28)
SODIUM BLD-SCNC: 140 MMOL/L (ref 136–145)
SP GR UR STRIP: 1.02 (ref 1–1.03)
TROPONIN T SERPL-MCNC: <0.01 NG/ML (ref 0–0.03)
UROBILINOGEN UR QL STRIP: ABNORMAL
WBC NRBC COR # BLD: 9.86 10*3/MM3 (ref 3.4–10.8)
WHOLE BLOOD HOLD SPECIMEN: NORMAL
WHOLE BLOOD HOLD SPECIMEN: NORMAL

## 2019-05-04 PROCEDURE — 93005 ELECTROCARDIOGRAM TRACING: CPT | Performed by: FAMILY MEDICINE

## 2019-05-04 PROCEDURE — 80053 COMPREHEN METABOLIC PANEL: CPT | Performed by: FAMILY MEDICINE

## 2019-05-04 PROCEDURE — 93010 ELECTROCARDIOGRAM REPORT: CPT | Performed by: INTERNAL MEDICINE

## 2019-05-04 PROCEDURE — 80307 DRUG TEST PRSMV CHEM ANLYZR: CPT | Performed by: FAMILY MEDICINE

## 2019-05-04 PROCEDURE — 96374 THER/PROPH/DIAG INJ IV PUSH: CPT

## 2019-05-04 PROCEDURE — 99284 EMERGENCY DEPT VISIT MOD MDM: CPT

## 2019-05-04 PROCEDURE — 81003 URINALYSIS AUTO W/O SCOPE: CPT | Performed by: FAMILY MEDICINE

## 2019-05-04 PROCEDURE — 81025 URINE PREGNANCY TEST: CPT | Performed by: FAMILY MEDICINE

## 2019-05-04 PROCEDURE — 84484 ASSAY OF TROPONIN QUANT: CPT | Performed by: FAMILY MEDICINE

## 2019-05-04 PROCEDURE — 70450 CT HEAD/BRAIN W/O DYE: CPT

## 2019-05-04 PROCEDURE — 96361 HYDRATE IV INFUSION ADD-ON: CPT

## 2019-05-04 PROCEDURE — 85025 COMPLETE CBC W/AUTO DIFF WBC: CPT | Performed by: FAMILY MEDICINE

## 2019-05-04 PROCEDURE — 82962 GLUCOSE BLOOD TEST: CPT

## 2019-05-04 PROCEDURE — 85610 PROTHROMBIN TIME: CPT | Performed by: FAMILY MEDICINE

## 2019-05-04 PROCEDURE — 25010000002 MORPHINE PER 10 MG: Performed by: FAMILY MEDICINE

## 2019-05-04 PROCEDURE — 71045 X-RAY EXAM CHEST 1 VIEW: CPT

## 2019-05-04 RX ORDER — OXYCODONE HYDROCHLORIDE AND ACETAMINOPHEN 5; 325 MG/1; MG/1
1 TABLET ORAL EVERY 6 HOURS PRN
Qty: 8 TABLET | Refills: 0 | Status: SHIPPED | OUTPATIENT
Start: 2019-05-04 | End: 2019-07-30

## 2019-05-04 RX ORDER — ONDANSETRON 4 MG/1
4 TABLET, FILM COATED ORAL EVERY 8 HOURS PRN
Qty: 8 TABLET | Refills: 0 | Status: SHIPPED | OUTPATIENT
Start: 2019-05-04 | End: 2019-07-30

## 2019-05-04 RX ORDER — MORPHINE SULFATE 2 MG/ML
2 INJECTION, SOLUTION INTRAMUSCULAR; INTRAVENOUS ONCE
Status: COMPLETED | OUTPATIENT
Start: 2019-05-04 | End: 2019-05-04

## 2019-05-04 RX ORDER — SODIUM CHLORIDE 0.9 % (FLUSH) 0.9 %
10 SYRINGE (ML) INJECTION AS NEEDED
Status: DISCONTINUED | OUTPATIENT
Start: 2019-05-04 | End: 2019-05-05 | Stop reason: HOSPADM

## 2019-05-04 RX ADMIN — MORPHINE SULFATE 2 MG: 2 INJECTION, SOLUTION INTRAMUSCULAR; INTRAVENOUS at 21:26

## 2019-05-04 RX ADMIN — SODIUM CHLORIDE 1000 ML: 900 INJECTION, SOLUTION INTRAVENOUS at 19:42

## 2019-05-04 NOTE — ED TRIAGE NOTES
"Pt presents stating that she is \"going in and out of consciousness\" after not being able to eat for weeks r/t oral thrush. Pt's tongue is nearly entirely white and she has a blister to her lip. Pt also states she has been spitting blood.  "

## 2019-05-05 NOTE — ED NOTES
Advised pt on the need of urine, pt stated can not urinate now      Francheska Vasquez, RN  05/04/19 1948

## 2019-05-05 NOTE — ED NOTES
Pt DC to waiting room awaiting ride home. Paperwork given to security for ride to sign related to meds given      Francheska Vasquez RN  05/04/19 2186

## 2019-05-05 NOTE — ED NOTES
FSBS 65, offered food and drink.  States that she is unable to eat related to thrush. Stated that she will try     Francheska Vasquez RN  05/04/19 [id number]

## 2019-05-05 NOTE — ED NOTES
Redness and swelling noted around mouth, on lips. White patchy sores noted to inside of mucosa      Elizabeth Rosales, RN  05/04/19 1901

## 2019-05-05 NOTE — ED PROVIDER NOTES
Subjective     History provided by:  Patient   used: No    The patient is a 29 years old female with past medical history of adjustment disorder with depression who recently was seen in the ER because of oral thrush and the herpes labialis.  That she has not been eating or drinking since this started.  She said that she had a tooth extraction about a week and a half ago.)  And that was when she was diagnosed with thrush.  Denies any fever chills or sweating.    Review of Systems   HENT: Positive for mouth sores.    All other systems reviewed and are negative.      Past Medical History:   Diagnosis Date   • Adjustment disorder with depressed mood     Adjustment disorder with depressed mood - on celexa and 1 week risperdal   • Costal chondritis    • Cough     Cough - improved   • Hand eczema    • Influenza    • Not vaccinated against influenza 2016    INFLUENZA IMMUN NOT ADMIN, REASON NOT DOC  (1) - CHERI GRANADOS ([facility])    • Rib pain    • Stress     Difficulty managing stress   • Upper respiratory infection        No Known Allergies    Past Surgical History:   Procedure Laterality Date   •  SECTION       Section (2)   • CHOLECYSTECTOMY      Cholecystectomy (1)   • CHOLECYSTECTOMY      Cholecystectomy, laparoscopic (1)   • CYSTOSCOPY      Cystoscopy (1)   • CYSTOSCOPY      Cystoscopy & treatment (1)   • DENTAL PROCEDURE      Dental surgery procedure (1) - wisdom teeth       Family History   Problem Relation Age of Onset   • ADD / ADHD Mother    • Diabetes Mother    • Anxiety disorder Mother    • ADD / ADHD Brother    • Heart murmur Brother    • Diabetes Maternal Grandmother    • Depression Other         Depressive disorder   • Heart disease Other    • Retinitis pigmentosa Other    • Arrhythmia Other    • Breast cancer Neg Hx    • Colon cancer Neg Hx         Colorectal cancer   • Endometrial cancer Neg Hx    • Ovarian cancer Neg Hx        Social History     Socioeconomic History  "  • Marital status:      Spouse name: Not on file   • Number of children: Not on file   • Years of education: Not on file   • Highest education level: Not on file   Tobacco Use   • Smoking status: Never Smoker   • Smokeless tobacco: Never Used   Substance and Sexual Activity   • Alcohol use: Yes     Comment: rare   • Drug use: No   • Sexual activity: Yes     Partners: Male     Birth control/protection: Implant     Comment:    Social History Narrative    ** Merged History Encounter **            /77   Pulse 94   Temp 98.5 °F (36.9 °C) (Oral)   Resp 17   Ht 149.9 cm (59\")   Wt 79.4 kg (175 lb)   SpO2 96%   BMI 35.35 kg/m²     Objective   Physical Exam   Constitutional: She is oriented to person, place, and time. She appears well-developed and well-nourished.   HENT:   Head: Normocephalic.   Right Ear: External ear normal.   Left Ear: External ear normal.   Nose: Nose normal.   Mouth/Throat: Oropharynx is clear and moist. Oral lesions present. Dental caries present.   Lips exudates, oral thrush.   Neck: Normal range of motion. Neck supple.   Cardiovascular: Normal rate, regular rhythm, normal heart sounds and intact distal pulses.   Pulmonary/Chest: Effort normal and breath sounds normal.   Abdominal: Soft. Bowel sounds are normal.   Musculoskeletal: Normal range of motion.   Neurological: She is alert and oriented to person, place, and time.   Skin: Skin is warm. Capillary refill takes less than 2 seconds.   Nursing note and vitals reviewed.      Procedures           ED Course      Labs Reviewed   COMPREHENSIVE METABOLIC PANEL - Abnormal; Notable for the following components:       Result Value    ALT (SGPT) 41 (*)     AST (SGOT) 35 (*)     All other components within normal limits    Narrative:     GFR Normal >60  Chronic Kidney Disease <60  Kidney Failure <15   URINALYSIS W/ MICROSCOPIC IF INDICATED (NO CULTURE) - Abnormal; Notable for the following components:    Appearance, UA Cloudy (*) "     Ketones, UA 80 mg/dL (3+) (*)     Bilirubin, UA Small (1+) (*)     All other components within normal limits    Narrative:     Urine microscopic not indicated.   PROTIME-INR - Normal    Narrative:     Therapeutic range for most indications is 2.0-3.0 INR,  or 2.5-3.5 for mechanical heart valves.   TROPONIN (IN-HOUSE) - Normal    Narrative:     Troponin T Reference Range:  <= 0.03 ng/mL-   Negative for AMI  >0.03 ng/mL-     Abnormal for myocardial necrosis.  Clinicians would have to utilize clinical acumen, EKG, Troponin and serial changes to determine if it is an Acute Myocardial Infarction or myocardial injury due to an underlying chronic condition.    URINE DRUG SCREEN - Normal    Narrative:     Negative Thresholds For Drugs Screened:     Amphetamines               500 ng/ml   Barbiturates               200 ng/ml   Benzodiazepines            100 ng/ml   Cocaine                    300 ng/ml   Methadone                  300 ng/ml   Opiates                    300 ng/ml   Oxycodone                  100 ng/ml   THC                        50 ng/ml    The Normal Value for all drugs tested is negative. This report includes final unconfirmed screening results to be used for medical treatment purposes only. Unconfirmed results must not be used for non-medical purposes such as employment or legal testing. Clinical consideration should be applied to any drug of abuse test, particulary when unconfirmed results are used.   PREGNANCY, URINE - Normal   CBC WITH AUTO DIFFERENTIAL - Normal   POCT GLUCOSE FINGERSTICK - Normal   RAINBOW DRAW    Narrative:     The following orders were created for panel order [place] Draw.  Procedure                               Abnormality         Status                     ---------                               -----------         ------                     Light Blue Top[[id number]]                                   Final result               Green Top (Gel)[[id number]]                                   Final result               Lavender Top[[id number]]                                     Final result               Gold Top - SST[[id number]]                                   Final result                 Please view results for these tests on the individual orders.   POCT GLUCOSE FINGERSTICK   POCT GLUCOSE FINGERSTICK   CBC AND DIFFERENTIAL    Narrative:     The following orders were created for panel order CBC & Differential.  Procedure                               Abnormality         Status                     ---------                               -----------         ------                     CBC Auto Differential[[id number]]        Normal              Final result                 Please view results for these tests on the individual orders.   LIGHT BLUE TOP   GREEN TOP   LAVENDER TOP   GOLD TOP - SST       CT Head Without Contrast   Final Result      There is no acute intracranial abnormality.      If clinical concern exists regarding an acute ischemic/vascular   pathology being responsible for patient's symptomatology, an MRI   of the brain is more sensitive than the current study, in ruling   out such a possibility.      Electronically signed by:  Austin Romero MD  5/4/2019 9:18 PM CDT   Workstation: [id number]      XR Chest 1 View   Final Result   CONCLUSION:          1. No evidence of an active cardiopulmonary process.                                                                    Electronically signed by:  CORINE Britton MD  5/4/2019 8:14 PM   CDT Workstation: [id number]        Result discussed with patient.  Patient was told to follow with her primary care doctor in 2 to 3 days.  Come back to the ER if symptoms get worse.          MDM      Final diagnoses:   Weakness   Herpes labialis   Oral thrush            Gerardo Jones MD  05/04/19 2068

## 2019-07-03 ENCOUNTER — TRANSCRIBE ORDERS (OUTPATIENT)
Dept: LAB | Facility: HOSPITAL | Age: 29
End: 2019-07-03

## 2019-07-03 DIAGNOSIS — R31.21 ASYMPTOMATIC MICROSCOPIC HEMATURIA: Primary | ICD-10-CM

## 2019-07-05 ENCOUNTER — HOSPITAL ENCOUNTER (OUTPATIENT)
Dept: CT IMAGING | Facility: HOSPITAL | Age: 29
Discharge: HOME | End: 2019-07-05
Admitting: NURSE PRACTITIONER

## 2019-07-05 ENCOUNTER — APPOINTMENT (OUTPATIENT)
Dept: LAB | Facility: HOSPITAL | Age: 29
End: 2019-07-05

## 2019-07-05 DIAGNOSIS — R31.21 ASYMPTOMATIC MICROSCOPIC HEMATURIA: ICD-10-CM

## 2019-07-05 LAB — HCG SERPL QL: NEGATIVE

## 2019-07-05 PROCEDURE — 36415 COLL VENOUS BLD VENIPUNCTURE: CPT | Performed by: PHYSICIAN ASSISTANT

## 2019-07-05 PROCEDURE — 84703 CHORIONIC GONADOTROPIN ASSAY: CPT | Performed by: PHYSICIAN ASSISTANT

## 2019-07-05 PROCEDURE — 74178 CT ABD&PLV WO CNTR FLWD CNTR: CPT

## 2019-07-05 PROCEDURE — 25010000002 IOPAMIDOL 61 % SOLUTION: Performed by: NURSE PRACTITIONER

## 2019-07-05 RX ADMIN — IOPAMIDOL 90 ML: 612 INJECTION, SOLUTION INTRAVENOUS at 10:05

## 2019-07-30 ENCOUNTER — OFFICE VISIT (OUTPATIENT)
Dept: GASTROENTEROLOGY | Facility: CLINIC | Age: 29
End: 2019-07-30

## 2019-07-30 VITALS
WEIGHT: 169.8 LBS | DIASTOLIC BLOOD PRESSURE: 78 MMHG | SYSTOLIC BLOOD PRESSURE: 120 MMHG | HEIGHT: 59 IN | BODY MASS INDEX: 34.23 KG/M2 | HEART RATE: 98 BPM

## 2019-07-30 DIAGNOSIS — R19.7 DIARRHEA, UNSPECIFIED TYPE: ICD-10-CM

## 2019-07-30 DIAGNOSIS — R10.84 GENERALIZED ABDOMINAL PAIN: ICD-10-CM

## 2019-07-30 DIAGNOSIS — K92.1 BLOOD IN STOOL: Primary | ICD-10-CM

## 2019-07-30 PROCEDURE — 99214 OFFICE O/P EST MOD 30 MIN: CPT | Performed by: NURSE PRACTITIONER

## 2019-07-30 RX ORDER — SULFAMETHOXAZOLE AND TRIMETHOPRIM 800; 160 MG/1; MG/1
1 TABLET ORAL 2 TIMES DAILY
COMMUNITY
End: 2019-09-24

## 2019-07-30 RX ORDER — DEXTROSE AND SODIUM CHLORIDE 5; .45 G/100ML; G/100ML
30 INJECTION, SOLUTION INTRAVENOUS CONTINUOUS PRN
Status: CANCELLED | OUTPATIENT
Start: 2019-08-12

## 2019-07-30 NOTE — PROGRESS NOTES
"Chief Complaint   Patient presents with   • Rectal Bleeding   • Abdominal Pain   • Abdominal Cramping       Subjective    Irene Collins is a 29 y.o. female. she is being seen for consultation today at the request of CHANDNI Castro    Rectal Bleeding   Associated symptoms include abdominal pain and fatigue. Pertinent negatives include no anorexia, arthralgias, chills, diaphoresis, fever, headaches, myalgias, nausea, sore throat or vomiting.   Abdominal Pain   This is a new problem. The current episode started more than 1 month ago (4 months ). The problem occurs daily. The problem has been waxing and waning. The quality of the pain is cramping. Associated symptoms include belching, diarrhea (with urgency ), hematochezia and weight loss. Pertinent negatives include no anorexia, arthralgias, constipation, dysuria, fever, flatus, frequency, headaches, melena, myalgias, nausea or vomiting. Associated symptoms comments: Bloated \"feels like a balloon about to pop\" .   Abdominal Cramping   Associated symptoms include belching, diarrhea (with urgency ), hematochezia and weight loss. Pertinent negatives include no anorexia, arthralgias, constipation, dysuria, fever, flatus, frequency, headaches, melena, myalgias, nausea or vomiting.   Patient reports probiotic has somewhat helped symptoms but still has 1-6 loose bowel movements per day states caliber changes from yogurt-like consistency to consult in stool.  Plan; schedule patient for EGD and colonoscopy due to abdominal pain diarrhea and blood in stool.    We will go ahead and start Xifaxan 550 mg 3 times a day for air bowel syndrome diarrhea predominant follow-up after test return office sooner if needed         The following portions of the patient's history were reviewed and updated as appropriate:   Past Medical History:   Diagnosis Date   • Adjustment disorder with depressed mood     Adjustment disorder with depressed mood - on celexa and 1 week risperdal "   • Costal chondritis    • Cough     Cough - improved   • Hand eczema    • Influenza    • Not vaccinated against influenza 2016    INFLUENZA IMMUN NOT ADMIN, REASON NOT DOC  (1) - [facility] GRANADOS ([facility])    • Rib pain    • Stress     Difficulty managing stress   • Upper respiratory infection      Past Surgical History:   Procedure Laterality Date   •  SECTION       Section (2)   • CHOLECYSTECTOMY      Cholecystectomy (1)   • CHOLECYSTECTOMY      Cholecystectomy, laparoscopic (1)   • COLONOSCOPY     • CYSTOSCOPY      Cystoscopy (1)   • CYSTOSCOPY      Cystoscopy & treatment (1)   • DENTAL PROCEDURE      Dental surgery procedure (1) - wisdom teeth     Family History   Problem Relation Age of Onset   • ADD / ADHD Mother    • Diabetes Mother    • Anxiety disorder Mother    • ADD / ADHD Brother    • Heart murmur Brother    • Diabetes Maternal Grandmother    • Depression Other         Depressive disorder   • Heart disease Other    • Retinitis pigmentosa Other    • Arrhythmia Other    • Breast cancer Neg Hx    • Colon cancer Neg Hx         Colorectal cancer   • Endometrial cancer Neg Hx    • Ovarian cancer Neg Hx      OB History     No data available        Prior to Admission medications    Medication Sig Start Date End Date Taking? Authorizing Provider   aspirin 81 MG EC tablet Take 81 mg by mouth Daily.   Yes Emergency, Nurse Epic, RN   B Complex-C (SUPER B COMPLEX PO) Take  by mouth.   Yes ProviderLing MD   levonorgestrel (MIRENA, 52 MG,) 20 MCG/24HR IUD 1 each by Intrauterine route 1 (one) time.   Yes ProviderLing MD   Probiotic Product (SOLUBLE FIBER/PROBIOTICS PO) Take  by mouth.   Yes ProviderLing MD   sertraline (ZOLOFT) 25 MG tablet Take 25 mg by mouth Daily.   Yes Emergency, Nurse AFIA Pal   albuterol sulfate  (90 Base) MCG/ACT inhaler Inhale 2 puffs Every 6 (Six) Hours As Needed for Wheezing or Shortness of Air. 3/19/19 7/30/19 Yes Dustin Parikh,  ERYN   ondansetron (ZOFRAN) 4 MG tablet Take 1 tablet by mouth Every 8 (Eight) Hours As Needed for Nausea or Vomiting. 5/4/19 7/30/19 Yes Gerardo Jones MD   azithromycin (ZITHROMAX) 250 MG tablet Take 2 tablets the first day, then 1 tablet daily for 4 days. 9/13/18 7/30/19  Elvira Bhat FNP   oxyCODONE-acetaminophen (PERCOCET) 5-325 MG per tablet Take 1 tablet by mouth Every 6 (Six) Hours As Needed for Moderate Pain . 5/4/19 7/30/19  Gerardo Jones MD   promethazine-dextromethorphan (PROMETHAZINE-DM) 6.25-15 MG/5ML syrup Take 5 mL by mouth 4 (Four) Times a Day As Needed for Cough. 9/13/18 7/30/19  Elvira Bhat FNP     No Known Allergies  Social History     Socioeconomic History   • Marital status: Legally      Spouse name: Not on file   • Number of children: Not on file   • Years of education: Not on file   • Highest education level: Not on file   Tobacco Use   • Smoking status: Never Smoker   • Smokeless tobacco: Never Used   Substance and Sexual Activity   • Alcohol use: Yes     Comment: rare   • Drug use: No   • Sexual activity: Yes     Partners: Male     Birth control/protection: Implant     Comment:    Social History Narrative    ** Merged History Encounter **            Review of Systems  Review of Systems   Constitutional: Positive for activity change, appetite change, fatigue and weight loss. Negative for chills, diaphoresis, fever and unexpected weight change.   HENT: Negative for sore throat and trouble swallowing.    Respiratory: Negative for shortness of breath.    Gastrointestinal: Positive for abdominal distention (constant bloating ), abdominal pain, blood in stool, diarrhea (with urgency ) and hematochezia. Negative for anal bleeding, anorexia, constipation, flatus, melena, nausea, rectal pain and vomiting.   Genitourinary: Negative for dysuria and frequency.   Musculoskeletal: Negative for arthralgias and myalgias.   Skin: Negative for pallor.  "  Neurological: Negative for light-headedness and headaches.        /78 (BP Location: Left arm)   Pulse 98   Ht 149.9 cm (59\")   Wt 77 kg (169 lb 12.8 oz)   BMI 34.30 kg/m²     Objective    Physical Exam   Constitutional: She is oriented to person, place, and time. She appears well-developed and well-nourished. She is cooperative. No distress.   HENT:   Head: Normocephalic and atraumatic.   Neck: Normal range of motion. Neck supple. No thyromegaly present.   Cardiovascular: Normal rate, regular rhythm and normal heart sounds.   Pulmonary/Chest: Effort normal and breath sounds normal. She has no wheezes. She has no rhonchi. She has no rales.   Abdominal: Soft. Normal appearance and bowel sounds are normal. She exhibits no distension. There is no hepatosplenomegaly. There is generalized tenderness. There is no rigidity and no guarding. No hernia.   Lymphadenopathy:     She has no cervical adenopathy.   Neurological: She is alert and oriented to person, place, and time.   Skin: Skin is warm, dry and intact. No rash noted. No pallor.   Psychiatric: She has a normal mood and affect. Her speech is normal.     Transcribe Orders on 07/03/2019   Component Date Value Ref Range Status   • HCG Qualitative 07/05/2019 Negative  Negative Final     Assessment/Plan      1. Blood in stool    2. Diarrhea, unspecified type    3. Generalized abdominal pain    .       Orders placed during this encounter include:  Orders Placed This Encounter   Procedures   • Follow Anesthesia Guidelines / Standing Orders     Standing Status:   Future   • Obtain Informed Consent     Standing Status:   Future     Order Specific Question:   Informed Consent Given For     Answer:   ESOPHAGOGASTRODUODENOSCOPY and Colonoscopy       ESOPHAGOGASTRODUODENOSCOPY (N/A), COLONOSCOPY (N/A)    Review and/or summary of lab tests, radiology, procedures, medications. Review and summary of old records and obtaining of history. The risks and benefits of my " recommendations, as well as other treatment options were discussed with the patient today. Questions were answered.    New Medications Ordered This Visit   Medications   • polyethylene glycol (GoLYTELY) 236 g solution     Sig: Starting at noon on day prior to procedure, drink 8 ounces every 30 minutes until all gone or stools are clear. May add flavor packet.     Dispense:  4000 mL     Refill:  0   • rifaximin (XIFAXAN) 550 MG tablet     Sig: Take 1 tablet by mouth 3 (Three) Times a Day.     Dispense:  42 tablet     Refill:  1       Follow-up: Return in about 4 weeks (around 8/27/2019) for After test.          This document has been electronically signed by CHANDNI Murray on July 30, 2019 10:15 AM             Results for orders placed or performed in visit on 07/03/19   hCG, Serum, Qualitative   Result Value Ref Range    HCG Qualitative Negative Negative   Results for orders placed or performed during the hospital encounter of 05/04/19   Gold Top - SST   Result Value Ref Range    Extra Tube Hold for add-ons.    Green Top (Gel)   Result Value Ref Range    Extra Tube Hold for add-ons.    Urinalysis With Microscopic If Indicated (No Culture) - Urine, Clean Catch   Result Value Ref Range    Color, UA Yellow Yellow, Straw, Dark Yellow, Nadja    Appearance, UA Cloudy (A) Clear    pH, UA 5.5 5.0 - 9.0    Specific Gravity, UA 1.021 1.003 - 1.030    Glucose, UA Negative Negative    Ketones, UA 80 mg/dL (3+) (A) Negative    Bilirubin, UA Small (1+) (A) Negative    Blood, UA Negative Negative    Protein, UA Negative Negative    Leuk Esterase, UA Negative Negative    Nitrite, UA Negative Negative    Urobilinogen, UA 0.2 E.U./dL 0.2 - 1.0 E.U./dL   CBC Auto Differential   Result Value Ref Range    WBC 9.86 3.40 - 10.80 10*3/mm3    RBC 4.65 3.77 - 5.28 10*6/mm3    Hemoglobin 14.0 12.0 - 15.9 g/dL    Hematocrit 42.0 34.0 - 46.6 %    MCV 90.3 79.0 - 97.0 fL    MCH 30.1 26.6 - 33.0 pg    MCHC 33.3 31.5 - 35.7 g/dL    RDW 13.2  12.3 - 15.4 %    RDW-SD 43.5 37.0 - 54.0 fl    MPV 9.3 6.0 - 12.0 fL    Platelets 335 140 - 450 10*3/mm3    Neutrophil % 64.6 42.7 - 76.0 %    Lymphocyte % 24.0 19.6 - 45.3 %    Monocyte % 8.4 5.0 - 12.0 %    Eosinophil % 2.0 0.3 - 6.2 %    Basophil % 0.6 0.0 - 1.5 %    Immature Grans % 0.4 0.0 - 0.5 %    Neutrophils, Absolute 6.36 1.70 - 7.00 10*3/mm3    Lymphocytes, Absolute 2.37 0.70 - 3.10 10*3/mm3    Monocytes, Absolute 0.83 0.10 - 0.90 10*3/mm3    Eosinophils, Absolute 0.20 0.00 - 0.40 10*3/mm3    Basophils, Absolute 0.06 0.00 - 0.20 10*3/mm3    Immature Grans, Absolute 0.04 0.00 - 0.05 10*3/mm3    nRBC 0.0 0.0 - 0.2 /100 WBC   Lavender Top   Result Value Ref Range    Extra Tube hold for add-on    Light Blue Top   Result Value Ref Range    Extra Tube hold for add-on    Troponin   Result Value Ref Range    Troponin T <0.010 0.000 - 0.030 ng/mL   Urine Drug Screen - Urine, Clean Catch   Result Value Ref Range    Amphet/Methamphet, Screen Negative Negative    Barbiturates Screen, Urine Negative Negative    Benzodiazepine Screen, Urine Negative Negative    Cocaine Screen, Urine Negative Negative    Opiate Screen Negative Negative    THC, Screen, Urine Negative Negative    Methadone Screen, Urine Negative Negative    Oxycodone Screen, Urine Negative Negative   Pregnancy, Urine - Urine, Clean Catch   Result Value Ref Range    HCG, Urine QL Negative Negative   Protime-INR   Result Value Ref Range    Protime 14.1 11.1 - 15.3 Seconds    INR 1.11 0.80 - 1.20   POC Glucose Once   Result Value Ref Range    Glucose 65 (L) 70 - 130 mg/dL   POC Glucose Once   Result Value Ref Range    Glucose 67 (L) 70 - 130 mg/dL   POC Glucose Once   Result Value Ref Range    Glucose 73 70 - 130 mg/dL   Comprehensive Metabolic Panel   Result Value Ref Range    Glucose 77 65 - 99 mg/dL    BUN 10 6 - 20 mg/dL    Creatinine 0.62 0.57 - 1.00 mg/dL    Sodium 140 136 - 145 mmol/L    Potassium 3.5 3.5 - 5.2 mmol/L    Chloride 100 98 - 107 mmol/L     CO2 25.0 22.0 - 29.0 mmol/L    Calcium 9.0 8.6 - 10.5 mg/dL    Total Protein 7.1 6.0 - 8.5 g/dL    Albumin 3.90 3.50 - 5.20 g/dL    ALT (SGPT) 41 (H) 1 - 33 U/L    AST (SGOT) 35 (H) 1 - 32 U/L    Alkaline Phosphatase 48 39 - 117 U/L    Total Bilirubin 0.4 0.2 - 1.2 mg/dL    eGFR Non African Amer 114 >60 mL/min/1.73    Globulin 3.2 gm/dL    A/G Ratio 1.2 g/dL    BUN/Creatinine Ratio 16.1 7.0 - 25.0    Anion Gap 15.0 mmol/L   Results for orders placed or performed during the hospital encounter of 03/19/19   Influenza Antigen, Rapid - Swab, Nasopharynx   Result Value Ref Range    Influenza A Ag, EIA Negative Negative    Influenza B Ag, EIA Negative Negative   Results for orders placed or performed in visit on 01/07/19   Liquid-based Pap Smear, Screening   Result Value Ref Range    Case Report       Gynecologic Cytology Report                       Case: [id number]                                  Authorizing Provider:  Lynnette Harvey, Collected:           01/07/2019 01:07 PM                                 APRN                                                                         First Screen:          Yaz Vale            Received:            01/09/2019 10:52 AM          Pathologist:           Tyron Trent MD                                                         Specimen:    Liquid-Based Pap, Screening, Cervix, Endocervix                                            Interpretation Negative for intraepithelial lesion or malignancy      General Categorization Within normal limits     Other Findings       Reactive cellular changes  Fungal organisms morphologically consistent with Candida spp    Specimen Adequacy Satisfactory for evaluation     [facility] 12/25/18     Additional Information       Disclaimer: Cervical cytology is a screening test primarily for squamous cancer and its precursors and has associated false-negative and false-positive results.  Technologies such as liquid-based preparations  may decrease but will not eliminate all false-negative results.  Follow-up of unexplained clinical signs and symptoms is recommended to minimize false-negative results. (The [place] System for Reporting Cervical Cytology: [place], 2015).     Results for orders placed or performed during the hospital encounter of 09/13/18   POCT Influenza A/B   Result Value Ref Range    Rapid Influenza A Ag NEGATIVE     Rapid Influenza B Ag NEGATIVE     Internal Control Passed Passed    Lot Number [id number]     Expiration Date 03/06/21    POCT Rapid Strep A   Result Value Ref Range    Rapid Strep A Screen Negative Negative, VALID, INVALID, Not Performed    Internal Control Passed Passed    Lot Number [id number]     Expiration Date 02/29/20    Results for orders placed or performed during the hospital encounter of 02/12/18   POCT Rapid Strep A   Result Value Ref Range    Rapid Strep A Screen Negative Negative, VALID, INVALID, Not Performed    Internal Control Passed Passed    Lot Number [id number]     Expiration Date 10/18    Results for orders placed or performed in visit on 12/21/17   HPV DNA Probe, Direct   Result Value Ref Range    HPV Aptima Negative Negative   Liquid-based Pap Smear, Screening   Result Value Ref Range    Case Report       Gynecologic Cytology Report                       Case: [id number]                                  Authorizing Provider:  Lynnette Harvey,  Collected:           12/21/2017 10:10 AM                                 APRN                                                                         First Screen:          Marcela Noonan            Received:            12/26/2017 10:10 AM          Pathologist:           Elvin Rich MD                                                         Specimen:    Liquid-Based Pap, Screening, Cervix, Endocervix                                            Interpretation (A)       Atypical squamous cells of undetermined significance    Other Findings  Moderate Inflammation     Specimen Adequacy       Satisfactory for evaluation, endocervical/transformation zone component present    LMP 12/9/17     Additional Information       Disclaimer: Cervical cytology is a screening test primarily for squamous cancer and its precursors and has associated false-negative and false-positive results.  Technologies such as liquid-based preparations may decrease but will not eliminate all false-negative results.  Follow-up of unexplained clinical signs and symptoms is recommended to minimize false-negative results. (The [place] System for Reporting Cervical Cytology: [place], 2015).      Embedded Images       *Note: Due to a large number of results and/or encounters for the requested time period, some results have not been displayed. A complete set of results can be found in Results Review.

## 2019-08-12 ENCOUNTER — ANESTHESIA (OUTPATIENT)
Dept: GASTROENTEROLOGY | Facility: HOSPITAL | Age: 29
End: 2019-08-12

## 2019-08-12 ENCOUNTER — HOSPITAL ENCOUNTER (OUTPATIENT)
Facility: HOSPITAL | Age: 29
Setting detail: HOSPITAL OUTPATIENT SURGERY
Discharge: HOME | End: 2019-08-12
Attending: INTERNAL MEDICINE | Admitting: INTERNAL MEDICINE

## 2019-08-12 ENCOUNTER — ANESTHESIA EVENT (OUTPATIENT)
Dept: GASTROENTEROLOGY | Facility: HOSPITAL | Age: 29
End: 2019-08-12

## 2019-08-12 VITALS
HEART RATE: 80 BPM | DIASTOLIC BLOOD PRESSURE: 62 MMHG | OXYGEN SATURATION: 96 % | HEIGHT: 59 IN | TEMPERATURE: 97.6 F | WEIGHT: 169.09 LBS | RESPIRATION RATE: 18 BRPM | SYSTOLIC BLOOD PRESSURE: 106 MMHG | BODY MASS INDEX: 34.09 KG/M2

## 2019-08-12 DIAGNOSIS — R10.84 GENERALIZED ABDOMINAL PAIN: ICD-10-CM

## 2019-08-12 DIAGNOSIS — K92.1 BLOOD IN STOOL: ICD-10-CM

## 2019-08-12 DIAGNOSIS — R19.7 DIARRHEA, UNSPECIFIED TYPE: ICD-10-CM

## 2019-08-12 PROCEDURE — 25010000002 PROPOFOL 10 MG/ML EMULSION: Performed by: NURSE ANESTHETIST, CERTIFIED REGISTERED

## 2019-08-12 PROCEDURE — 88305 TISSUE EXAM BY PATHOLOGIST: CPT | Performed by: INTERNAL MEDICINE

## 2019-08-12 PROCEDURE — 88305 TISSUE EXAM BY PATHOLOGIST: CPT | Performed by: PATHOLOGY

## 2019-08-12 PROCEDURE — 45380 COLONOSCOPY AND BIOPSY: CPT | Performed by: INTERNAL MEDICINE

## 2019-08-12 PROCEDURE — 43239 EGD BIOPSY SINGLE/MULTIPLE: CPT | Performed by: INTERNAL MEDICINE

## 2019-08-12 RX ORDER — LIDOCAINE HYDROCHLORIDE 20 MG/ML
INJECTION, SOLUTION EPIDURAL; INFILTRATION; INTRACAUDAL; PERINEURAL AS NEEDED
Status: DISCONTINUED | OUTPATIENT
Start: 2019-08-12 | End: 2019-08-12 | Stop reason: SURG

## 2019-08-12 RX ORDER — PROPOFOL 10 MG/ML
VIAL (ML) INTRAVENOUS AS NEEDED
Status: DISCONTINUED | OUTPATIENT
Start: 2019-08-12 | End: 2019-08-12 | Stop reason: SURG

## 2019-08-12 RX ORDER — DEXTROSE AND SODIUM CHLORIDE 5; .45 G/100ML; G/100ML
30 INJECTION, SOLUTION INTRAVENOUS CONTINUOUS PRN
Status: DISCONTINUED | OUTPATIENT
Start: 2019-08-12 | End: 2019-08-12 | Stop reason: HOSPADM

## 2019-08-12 RX ADMIN — DEXTROSE AND SODIUM CHLORIDE 30 ML/HR: 5; 450 INJECTION, SOLUTION INTRAVENOUS at 10:41

## 2019-08-12 RX ADMIN — PROPOFOL 80 MG: 10 INJECTION, EMULSION INTRAVENOUS at 11:04

## 2019-08-12 RX ADMIN — LIDOCAINE HYDROCHLORIDE 60 MG: 20 INJECTION, SOLUTION EPIDURAL; INFILTRATION; INTRACAUDAL; PERINEURAL at 11:04

## 2019-08-12 RX ADMIN — PROPOFOL 30 MG: 10 INJECTION, EMULSION INTRAVENOUS at 11:14

## 2019-08-12 RX ADMIN — PROPOFOL 20 MG: 10 INJECTION, EMULSION INTRAVENOUS at 11:07

## 2019-08-12 RX ADMIN — PROPOFOL 40 MG: 10 INJECTION, EMULSION INTRAVENOUS at 11:10

## 2019-08-12 NOTE — ANESTHESIA POSTPROCEDURE EVALUATION
Patient: Irene Collins    Procedure Summary     Date:  08/12/19 Room / Location:  [facility] ENDOSCOPY 1 / [facility] ENDOSCOPY    Anesthesia Start:  1104 Anesthesia Stop:  1117    Procedures:       ESOPHAGOGASTRODUODENOSCOPY (N/A )      COLONOSCOPY (N/A ) Diagnosis:       Blood in stool      Diarrhea, unspecified type      Generalized abdominal pain      (Blood in stool [K92.1])      (Diarrhea, unspecified type [R19.7])      (Generalized abdominal pain [R10.84])    Surgeon:  Obed Vieira MD Provider:  Carlin Ramirez CRNA    Anesthesia Type:  MAC ASA Status:  2          Anesthesia Type: MAC  Last vitals  BP   116/81 (08/12/19 1028)   Temp   97.6 °F (36.4 °C) (08/12/19 1028)   Pulse   71 (08/12/19 1028)   Resp   19 (08/12/19 1028)     SpO2   97 % (08/12/19 1028)     Post Anesthesia Care and Evaluation    Patient location during evaluation: bedside  Patient participation: complete - patient participated  Level of consciousness: awake and awake and alert  Pain score: 0  Pain management: satisfactory to patient  Airway patency: patent  Anesthetic complications: No anesthetic complications  PONV Status: none  Cardiovascular status: acceptable and stable  Respiratory status: acceptable, room air and spontaneous ventilation  Hydration status: acceptable

## 2019-08-12 NOTE — ANESTHESIA PREPROCEDURE EVALUATION
Anesthesia Evaluation     Patient summary reviewed and Nursing notes reviewed   NPO Solid Status: > 8 hours  NPO Liquid Status: > 2 hours           Airway   Mallampati: II  TM distance: >3 FB  Neck ROM: full  No difficulty expected  Dental    (+) poor dentition    Pulmonary - normal exam   (+) recent URI,   Cardiovascular - negative cardio ROS and normal exam        Neuro/Psych  (+) psychiatric history Depression,     GI/Hepatic/Renal/Endo    (+)  GI bleeding,     Musculoskeletal (-) negative ROS    Abdominal  - normal exam   Substance History   (+) alcohol use,      OB/GYN negative ob/gyn ROS         Other - negative ROS                       Anesthesia Plan    ASA 2     MAC     intravenous induction   Anesthetic plan, all risks, benefits, and alternatives have been provided, discussed and informed consent has been obtained with: patient.

## 2019-08-13 LAB
LAB AP CASE REPORT: NORMAL
PATH REPORT.FINAL DX SPEC: NORMAL
PATH REPORT.GROSS SPEC: NORMAL

## 2019-09-24 ENCOUNTER — OFFICE VISIT (OUTPATIENT)
Dept: GASTROENTEROLOGY | Facility: CLINIC | Age: 29
End: 2019-09-24

## 2019-09-24 VITALS
HEART RATE: 67 BPM | SYSTOLIC BLOOD PRESSURE: 120 MMHG | OXYGEN SATURATION: 99 % | HEIGHT: 59 IN | BODY MASS INDEX: 33.47 KG/M2 | DIASTOLIC BLOOD PRESSURE: 70 MMHG | WEIGHT: 166 LBS

## 2019-09-24 DIAGNOSIS — K21.00 GASTROESOPHAGEAL REFLUX DISEASE WITH ESOPHAGITIS: Primary | ICD-10-CM

## 2019-09-24 DIAGNOSIS — K29.00 ACUTE GASTRITIS WITHOUT HEMORRHAGE, UNSPECIFIED GASTRITIS TYPE: ICD-10-CM

## 2019-09-24 DIAGNOSIS — K58.0 IRRITABLE BOWEL SYNDROME WITH DIARRHEA: ICD-10-CM

## 2019-09-24 PROCEDURE — 99214 OFFICE O/P EST MOD 30 MIN: CPT | Performed by: NURSE PRACTITIONER

## 2019-09-24 RX ORDER — NITROFURANTOIN 25; 75 MG/1; MG/1
CAPSULE ORAL
COMMUNITY
End: 2019-11-19

## 2019-09-24 RX ORDER — SUCRALFATE 1 G/1
1 TABLET ORAL 4 TIMES DAILY
Qty: 120 TABLET | Refills: 1 | Status: SHIPPED | OUTPATIENT
Start: 2019-09-24 | End: 2019-11-19

## 2019-09-24 RX ORDER — PANTOPRAZOLE SODIUM 40 MG/1
40 TABLET, DELAYED RELEASE ORAL DAILY
Qty: 30 TABLET | Refills: 2 | Status: SHIPPED | OUTPATIENT
Start: 2019-09-24 | End: 2019-11-19

## 2019-09-24 NOTE — PROGRESS NOTES
Chief Complaint   Patient presents with   • Black or Bloody Stool       Subjective    Irene Collins is a 29 y.o. female. she is here today for follow-up.  29-year-old female presents to discuss EGD and colonoscopy results.  States she still has some mucus in stool but denies any further rectal bleeding states she has some rectal itching frequently reports bowel movements seem to be better after colonoscopy but still has occasional episodes of diarrhea we tried to prescribe patient Xifaxan but was unable to obtain medication due to cost.  States she has been avoiding gastric irritants but still has reflux at times.    Irritable Bowel Syndrome   This is a chronic problem. The current episode started more than 1 year ago. The problem occurs intermittently. The problem has been waxing and waning. Associated symptoms include abdominal pain and fatigue. Pertinent negatives include no anorexia, arthralgias, change in bowel habit, chest pain, chills, congestion, coughing, diaphoresis, fever, nausea, neck pain, numbness, sore throat or vomiting. The treatment provided mild relief.     EGD noted mildly severe esophagitis, gastritis normal duodenum antral biopsy no reactive gastropathy.  Distal esophagus biopsy noted reactive changes squamous mucosa.  Duodenal biopsy noted no significant histologic abnormality.  Colonoscopy had adequate prep noted hemorrhoids otherwise normal exam.  Terminal ileum and random colonic biopsy noted no significant histologic abnormality she will need repeat colonoscopy at age 50 for surveillance.  Plan; start patient on Protonix daily and Carafate before meals and at bedtime.  Anusol rectal cream as needed for rectal discomfort.  Follow-up in 8 weeks for recheck return office sooner if needed       The following portions of the patient's history were reviewed and updated as appropriate:   Past Medical History:   Diagnosis Date   • Adjustment disorder with depressed mood     Adjustment  disorder with depressed mood - on celexa and 1 week risperdal   • Costal chondritis    • Cough     Cough - improved   • Hand eczema    • Influenza    • Not vaccinated against influenza 2016    INFLUENZA IMMUN NOT ADMIN, REASON NOT DOC  (1) - CHERI GRANADOS (P)    • Rib pain    • Stress     Difficulty managing stress   • Upper respiratory infection      Past Surgical History:   Procedure Laterality Date   •  SECTION       Section (2)   • CHOLECYSTECTOMY      Cholecystectomy (1)   • CHOLECYSTECTOMY      Cholecystectomy, laparoscopic (1)   • COLONOSCOPY     • COLONOSCOPY N/A 2019    Procedure: COLONOSCOPY;  Surgeon: Obed Vieira MD;  Location: [facility] ENDOSCOPY;  Service: Gastroenterology   • CYSTOSCOPY      Cystoscopy (1)   • CYSTOSCOPY      Cystoscopy & treatment (1)   • DENTAL PROCEDURE      Dental surgery procedure (1) - wisdom teeth   • ENDOSCOPY N/A 2019    Procedure: ESOPHAGOGASTRODUODENOSCOPY;  Surgeon: Obed Vieira MD;  Location: [facility] ENDOSCOPY;  Service: Gastroenterology     Family History   Problem Relation Age of Onset   • ADD / ADHD Mother    • Diabetes Mother    • Anxiety disorder Mother    • ADD / ADHD Brother    • Heart murmur Brother    • Diabetes Maternal Grandmother    • Depression Other         Depressive disorder   • Heart disease Other    • Retinitis pigmentosa Other    • Arrhythmia Other    • Breast cancer Neg Hx    • Colon cancer Neg Hx         Colorectal cancer   • Endometrial cancer Neg Hx    • Ovarian cancer Neg Hx      OB History     No data available        Prior to Admission medications    Medication Sig Start Date End Date Taking? Authorizing Provider   aspirin 81 MG EC tablet Take 81 mg by mouth Daily.   Yes Emergency, Nurse Demarco, RN   levonorgestrel (MIRENA, 52 MG,) 20 MCG/24HR IUD 1 each by Intrauterine route 1 (one) time.   Yes Provider, MD Ling   Multiple Vitamin (MULTI VITAMIN DAILY PO) Take 1 tablet by mouth Daily.   Yes  Ling Stanley MD   nitrofurantoin, macrocrystal-monohydrate, (MACROBID) 100 MG capsule Macrobid 100 mg capsule   Take 1 capsule every day by oral route.   Yes Ling Stanley MD   Probiotic Product (SOLUBLE FIBER/PROBIOTICS PO) Take  by mouth.   Yes Ling Stanley MD   sertraline (ZOLOFT) 50 MG tablet Take 50 mg by mouth Daily.   Yes Emergency, Nurse Demarco, RN   B Complex-C (SUPER B COMPLEX PO) Take 1 mg by mouth.  9/24/19  Ling Stanley MD   rifaximin (XIFAXAN) 550 MG tablet Take 1 tablet by mouth 3 (Three) Times a Day. 7/30/19 9/24/19  Katia Gibbs APRN   sulfamethoxazole-trimethoprim (BACTRIM DS,SEPTRA DS) 800-160 MG per tablet Take 1 tablet by mouth 2 (Two) Times a Day.  9/24/19  Ling Stanley MD     No Known Allergies  Social History     Socioeconomic History   • Marital status: Legally      Spouse name: Not on file   • Number of children: Not on file   • Years of education: Not on file   • Highest education level: Not on file   Tobacco Use   • Smoking status: Never Smoker   • Smokeless tobacco: Never Used   Substance and Sexual Activity   • Alcohol use: Yes     Comment: rare   • Drug use: No   • Sexual activity: Yes     Partners: Male     Birth control/protection: Implant     Comment:    Social History Narrative    ** Merged History Encounter **            Review of Systems  Review of Systems   Constitutional: Positive for fatigue. Negative for activity change, appetite change, chills, diaphoresis, fever and unexpected weight change.   HENT: Negative for congestion, sore throat and trouble swallowing.    Respiratory: Negative for cough and shortness of breath.    Cardiovascular: Negative for chest pain.   Gastrointestinal: Positive for abdominal pain and diarrhea. Negative for abdominal distention, anal bleeding, anorexia, blood in stool, change in bowel habit, constipation, nausea, rectal pain and vomiting.   Musculoskeletal: Negative for arthralgias and  "neck pain.   Skin: Negative for pallor.   Neurological: Negative for light-headedness and numbness.        /70   Pulse 67   Ht 149.9 cm (59\")   Wt 75.3 kg (166 lb)   SpO2 99%   BMI 33.53 kg/m²     Objective    Physical Exam   Constitutional: She is oriented to person, place, and time. She appears well-developed and well-nourished. She is cooperative. No distress.   HENT:   Head: Normocephalic and atraumatic.   Neck: Normal range of motion. Neck supple. No thyromegaly present.   Cardiovascular: Normal rate, regular rhythm and normal heart sounds.   Pulmonary/Chest: Effort normal and breath sounds normal. She has no wheezes. She has no rhonchi. She has no rales.   Abdominal: Soft. Normal appearance and bowel sounds are normal. She exhibits no distension. There is no hepatosplenomegaly. There is tenderness in the right upper quadrant, epigastric area and left upper quadrant. There is no rigidity and no guarding. No hernia.   Lymphadenopathy:     She has no cervical adenopathy.   Neurological: She is alert and oriented to person, place, and time.   Skin: Skin is warm, dry and intact. No rash noted. No pallor.   Psychiatric: She has a normal mood and affect. Her speech is normal.     Admission on 08/12/2019, Discharged on 08/12/2019   Component Date Value Ref Range Status   • Case Report 08/12/2019    Final                    Value:Surgical Pathology Report                         Case: [id number]                                  Authorizing Provider:  Obed Vieira MD        Collected:           08/12/2019 11:11 AM          Ordering Location:     [facility]             Received:            08/12/2019 01:11 PM                                 [facility]                                                     Pathologist:           Tyron Trent MD                                                         Specimens:   1) - Small Intestine, Duodenum, small bowel   cold bx                        "                        2) - Gastric, Antrum, antrum   cold bx                                                              3) - Esophagus, Distal, distal esophagus   cold bx                                                  4) - Small Intestine, Ileum, terminal ileum   cold bx                                               5) - Large Intestine, colonic mucosa   cold bx                                            • Final Diagnosis 08/12/2019    Final                    Value:This result contains rich text formatting which cannot be displayed here.   • Gross Description 08/12/2019    Final                    Value:This result contains rich text formatting which cannot be displayed here.     Assessment/Plan      1. Gastroesophageal reflux disease with esophagitis    2. Acute gastritis without hemorrhage, unspecified gastritis type    3. Irritable bowel syndrome with diarrhea    .       Orders placed during this encounter include:  No orders of the defined types were placed in this encounter.      * Surgery not found *    Review and/or summary of lab tests, radiology, procedures, medications. Review and summary of old records and obtaining of history. The risks and benefits of my recommendations, as well as other treatment options were discussed with the patient today. Questions were answered.    New Medications Ordered This Visit   Medications   • pantoprazole (PROTONIX) 40 MG EC tablet     Sig: Take 1 tablet by mouth Daily.     Dispense:  30 tablet     Refill:  2   • sucralfate (CARAFATE) 1 g tablet     Sig: Take 1 tablet by mouth 4 (Four) Times a Day.     Dispense:  120 tablet     Refill:  1   • hydrocortisone (ANUSOL-HC) 2.5 % rectal cream     Sig: Insert  into the rectum 2 (Two) Times a Day As Needed for Hemorrhoids (rectal discomfort).     Dispense:  30 g     Refill:  5       Follow-up: Return in about 8 weeks (around 11/19/2019).          This document has been electronically signed by CHANDNI Murray on  September 24, 2019 10:33 AM             Results for orders placed or performed during the hospital encounter of 08/12/19   Tissue Pathology Exam   Result Value Ref Range    Case Report       Surgical Pathology Report                         Case: [id number]                                  Authorizing Provider:  Obed Vieira MD        Collected:           08/12/2019 11:11 AM          Ordering Location:     [facility]             Received:            08/12/2019 01:11 PM                                 [place] [facility]                                                     Pathologist:           Tyron Trent MD                                                         Specimens:   1) - Small Intestine, Duodenum, small bowel   cold bx                                               2) - Gastric, Antrum, antrum   cold bx                                                              3) - Esophagus, Distal, distal esophagus   cold bx                                                  4) - Small Intestine, Ileum, terminal ileum   cold bx                                               5) - Large Intestine, colonic mucosa   cold bx                                             Final Diagnosis       1.  MUCOSA, DUODENUM:  NO SIGNIFICANT HISTOLOGIC ABNORMALITY.    2.  MUCOSA, ANTRUM OF STOMACH:  REACTIVE GASTROPATHY.    3.  MUCOSA, DISTAL ESOPHAGUS:  REACTIVE CHANGE OF SQUAMOUS MUCOSA.    4.  MUCOSA, TERMINAL ILEUM:  NO SIGNIFICANT HISTOLOGIC ABNORMALITY.    5.  MUCOSA, COLON:  NO SIGNIFICANT HISTOLOGIC ABNORMALITY.      Gross Description       Received for examination are 5 containers, each of which have nodular bits of white soft tissue measuring 0.3-0.5 cc in aggregate.  All specimens are embedded as labeled.  1A duodenum; 2A antrum of stomach; 3A distal esophagus; 4A terminal ileum; 5A mucosa of colon.     Results for orders placed or performed in visit on 07/03/19   hCG, Serum, Qualitative   Result Value Ref Range     HCG Qualitative Negative Negative   Results for orders placed or performed during the hospital encounter of 05/04/19   Gold Top - SST   Result Value Ref Range    Extra Tube Hold for add-ons.    Green Top (Gel)   Result Value Ref Range    Extra Tube Hold for add-ons.    Urinalysis With Microscopic If Indicated (No Culture) - Urine, Clean Catch   Result Value Ref Range    Color, UA Yellow Yellow, Straw, Dark Yellow, Nadja    Appearance, UA Cloudy (A) Clear    pH, UA 5.5 5.0 - 9.0    Specific Gravity, UA 1.021 1.003 - 1.030    Glucose, UA Negative Negative    Ketones, UA 80 mg/dL (3+) (A) Negative    Bilirubin, UA Small (1+) (A) Negative    Blood, UA Negative Negative    Protein, UA Negative Negative    Leuk Esterase, UA Negative Negative    Nitrite, UA Negative Negative    Urobilinogen, UA 0.2 E.U./dL 0.2 - 1.0 E.U./dL   CBC Auto Differential   Result Value Ref Range    WBC 9.86 3.40 - 10.80 10*3/mm3    RBC 4.65 3.77 - 5.28 10*6/mm3    Hemoglobin 14.0 12.0 - 15.9 g/dL    Hematocrit 42.0 34.0 - 46.6 %    MCV 90.3 79.0 - 97.0 fL    MCH 30.1 26.6 - 33.0 pg    MCHC 33.3 31.5 - 35.7 g/dL    RDW 13.2 12.3 - 15.4 %    RDW-SD 43.5 37.0 - 54.0 fl    MPV 9.3 6.0 - 12.0 fL    Platelets 335 140 - 450 10*3/mm3    Neutrophil % 64.6 42.7 - 76.0 %    Lymphocyte % 24.0 19.6 - 45.3 %    Monocyte % 8.4 5.0 - 12.0 %    Eosinophil % 2.0 0.3 - 6.2 %    Basophil % 0.6 0.0 - 1.5 %    Immature Grans % 0.4 0.0 - 0.5 %    Neutrophils, Absolute 6.36 1.70 - 7.00 10*3/mm3    Lymphocytes, Absolute 2.37 0.70 - 3.10 10*3/mm3    Monocytes, Absolute 0.83 0.10 - 0.90 10*3/mm3    Eosinophils, Absolute 0.20 0.00 - 0.40 10*3/mm3    Basophils, Absolute 0.06 0.00 - 0.20 10*3/mm3    Immature Grans, Absolute 0.04 0.00 - 0.05 10*3/mm3    nRBC 0.0 0.0 - 0.2 /100 WBC   Lavender Top   Result Value Ref Range    Extra Tube hold for add-on    Light Blue Top   Result Value Ref Range    Extra Tube hold for add-on    Troponin   Result Value Ref Range    Troponin T  <0.010 0.000 - 0.030 ng/mL   Urine Drug Screen - Urine, Clean Catch   Result Value Ref Range    Amphet/Methamphet, Screen Negative Negative    Barbiturates Screen, Urine Negative Negative    Benzodiazepine Screen, Urine Negative Negative    Cocaine Screen, Urine Negative Negative    Opiate Screen Negative Negative    THC, Screen, Urine Negative Negative    Methadone Screen, Urine Negative Negative    Oxycodone Screen, Urine Negative Negative   Pregnancy, Urine - Urine, Clean Catch   Result Value Ref Range    HCG, Urine QL Negative Negative   Protime-INR   Result Value Ref Range    Protime 14.1 11.1 - 15.3 Seconds    INR 1.11 0.80 - 1.20   POC Glucose Once   Result Value Ref Range    Glucose 65 (L) 70 - 130 mg/dL   POC Glucose Once   Result Value Ref Range    Glucose 67 (L) 70 - 130 mg/dL   POC Glucose Once   Result Value Ref Range    Glucose 73 70 - 130 mg/dL   Comprehensive Metabolic Panel   Result Value Ref Range    Glucose 77 65 - 99 mg/dL    BUN 10 6 - 20 mg/dL    Creatinine 0.62 0.57 - 1.00 mg/dL    Sodium 140 136 - 145 mmol/L    Potassium 3.5 3.5 - 5.2 mmol/L    Chloride 100 98 - 107 mmol/L    CO2 25.0 22.0 - 29.0 mmol/L    Calcium 9.0 8.6 - 10.5 mg/dL    Total Protein 7.1 6.0 - 8.5 g/dL    Albumin 3.90 3.50 - 5.20 g/dL    ALT (SGPT) 41 (H) 1 - 33 U/L    AST (SGOT) 35 (H) 1 - 32 U/L    Alkaline Phosphatase 48 39 - 117 U/L    Total Bilirubin 0.4 0.2 - 1.2 mg/dL    eGFR Non African Amer 114 >60 mL/min/1.73    Globulin 3.2 gm/dL    A/G Ratio 1.2 g/dL    BUN/Creatinine Ratio 16.1 7.0 - 25.0    Anion Gap 15.0 mmol/L   Results for orders placed or performed during the hospital encounter of 03/19/19   Influenza Antigen, Rapid - Swab, Nasopharynx   Result Value Ref Range    Influenza A Ag, EIA Negative Negative    Influenza B Ag, EIA Negative Negative   Results for orders placed or performed in visit on 01/07/19   Liquid-based Pap Smear, Screening   Result Value Ref Range    Case Report       Gynecologic Cytology  Report                       Case: [id number]                                  Authorizing Provider:  Wes Dereckeli Singer, Collected:           01/07/2019 01:07 PM                                 APRN                                                                         First Screen:          FabiolaYaz holly            Received:            01/09/2019 10:52 AM          Pathologist:           Tyron Trent MD                                                         Specimen:    Liquid-Based Pap, Screening, Cervix, Endocervix                                            Interpretation Negative for intraepithelial lesion or malignancy      General Categorization Within normal limits     Other Findings       Reactive cellular changes  Fungal organisms morphologically consistent with Candida spp    Specimen Adequacy Satisfactory for evaluation     LMP 12/25/18     Additional Information       Disclaimer: Cervical cytology is a screening test primarily for squamous cancer and its precursors and has associated false-negative and false-positive results.  Technologies such as liquid-based preparations may decrease but will not eliminate all false-negative results.  Follow-up of unexplained clinical signs and symptoms is recommended to minimize false-negative results. (The [place] System for Reporting Cervical Cytology: [place], 2015).     Results for orders placed or performed during the hospital encounter of 09/13/18   POCT Influenza A/B   Result Value Ref Range    Rapid Influenza A Ag NEGATIVE     Rapid Influenza B Ag NEGATIVE     Internal Control Passed Passed    Lot Number [id number]     Expiration Date 03/06/21    POCT Rapid Strep A   Result Value Ref Range    Rapid Strep A Screen Negative Negative, VALID, INVALID, Not Performed    Internal Control Passed Passed    Lot Number [id number]     Expiration Date 02/29/20    Results for orders placed or performed during the hospital encounter of 02/12/18   POCT Rapid  Strep A   Result Value Ref Range    Rapid Strep A Screen Negative Negative, VALID, INVALID, Not Performed    Internal Control Passed Passed    Lot Number [id number]     Expiration Date 10/18      *Note: Due to a large number of results and/or encounters for the requested time period, some results have not been displayed. A complete set of results can be found in Results Review.

## 2019-10-11 ENCOUNTER — TELEPHONE (OUTPATIENT)
Dept: GASTROENTEROLOGY | Facility: CLINIC | Age: 29
End: 2019-10-11

## 2019-10-11 NOTE — TELEPHONE ENCOUNTER
10/11/2019, 1732 - Patient telephone call returned per this staff member [phone].  Zero answer.  Voice message submitted with date, time, office contact information, notification office of CHANDNI Hoffman will return to clinic Monday, October 14, 2019, and notification to immediately stop utilization of prescription medication patient states is resulting in the side effect of Migraines until further notice received from office of CHANDNI Hoffman.  Patient instructed to contact office with questions.    Note - Patient prior clinical appointment with CHANDNI Hoffman 09/24/2019 with Protonix 40 MG Tablets, 1 tablet by mouth daily, Carafate 1 GM tablet, 1 tablet by mouth 4 times per day, and Anusol Rectal Cream, insert into the Rectum 2 times per day as needed for Hemorrhoids/rectal Discomfort, noted as prescription medications prescribed.    Note - Irma León, Medical Assistant for the office of CHANDNI Hoffman made aware of above via in-basket messaging.

## 2019-10-11 NOTE — TELEPHONE ENCOUNTER
----- Message from Cookie Carvajal sent at 10/11/2019  1:50 PM CDT -----  Patient called and stated that she is having side effects of the medication that she was given. After taking 4-5 days worth she gets bad migraines. Please call her back and advise her what to do. Thanks!

## 2019-10-14 ENCOUNTER — APPOINTMENT (OUTPATIENT)
Dept: GENERAL RADIOLOGY | Facility: HOSPITAL | Age: 29
End: 2019-10-14

## 2019-10-14 ENCOUNTER — APPOINTMENT (OUTPATIENT)
Dept: CT IMAGING | Facility: HOSPITAL | Age: 29
End: 2019-10-14

## 2019-10-14 ENCOUNTER — HOSPITAL ENCOUNTER (EMERGENCY)
Facility: HOSPITAL | Age: 29
Discharge: HOME | End: 2019-10-14
Attending: EMERGENCY MEDICINE | Admitting: EMERGENCY MEDICINE

## 2019-10-14 VITALS
OXYGEN SATURATION: 96 % | SYSTOLIC BLOOD PRESSURE: 138 MMHG | HEART RATE: 85 BPM | RESPIRATION RATE: 18 BRPM | WEIGHT: 170 LBS | TEMPERATURE: 97.8 F | BODY MASS INDEX: 34.27 KG/M2 | DIASTOLIC BLOOD PRESSURE: 87 MMHG | HEIGHT: 59 IN

## 2019-10-14 DIAGNOSIS — R51.9 NONINTRACTABLE HEADACHE, UNSPECIFIED CHRONICITY PATTERN, UNSPECIFIED HEADACHE TYPE: Primary | ICD-10-CM

## 2019-10-14 DIAGNOSIS — M79.10 MYALGIA: ICD-10-CM

## 2019-10-14 LAB
ALBUMIN SERPL-MCNC: 4.3 G/DL (ref 3.5–5.2)
ALBUMIN/GLOB SERPL: 1.8 G/DL
ALP SERPL-CCNC: 56 U/L (ref 39–117)
ALT SERPL W P-5'-P-CCNC: 60 U/L (ref 1–33)
AMPHET+METHAMPHET UR QL: NEGATIVE
AMPHETAMINES UR QL: NEGATIVE
ANION GAP SERPL CALCULATED.3IONS-SCNC: 8 MMOL/L (ref 5–15)
AST SERPL-CCNC: 42 U/L (ref 1–32)
BARBITURATES UR QL SCN: NEGATIVE
BASOPHILS # BLD AUTO: 0.04 10*3/MM3 (ref 0–0.2)
BASOPHILS NFR BLD AUTO: 0.6 % (ref 0–1.5)
BENZODIAZ UR QL SCN: NEGATIVE
BILIRUB SERPL-MCNC: 0.2 MG/DL (ref 0.2–1.2)
BUN BLD-MCNC: 10 MG/DL (ref 6–20)
BUN/CREAT SERPL: 15.4 (ref 7–25)
BUPRENORPHINE SERPL-MCNC: NEGATIVE NG/ML
CALCIUM SPEC-SCNC: 9 MG/DL (ref 8.6–10.5)
CANNABINOIDS SERPL QL: NEGATIVE
CHLORIDE SERPL-SCNC: 106 MMOL/L (ref 98–107)
CO2 SERPL-SCNC: 29 MMOL/L (ref 22–29)
COCAINE UR QL: NEGATIVE
CREAT BLD-MCNC: 0.65 MG/DL (ref 0.57–1)
DEPRECATED RDW RBC AUTO: 41.2 FL (ref 37–54)
EOSINOPHIL # BLD AUTO: 0.14 10*3/MM3 (ref 0–0.4)
EOSINOPHIL NFR BLD AUTO: 1.9 % (ref 0.3–6.2)
ERYTHROCYTE [DISTWIDTH] IN BLOOD BY AUTOMATED COUNT: 12.5 % (ref 12.3–15.4)
GFR SERPL CREATININE-BSD FRML MDRD: 108 ML/MIN/1.73
GLOBULIN UR ELPH-MCNC: 2.4 GM/DL
GLUCOSE BLD-MCNC: 86 MG/DL (ref 65–99)
HCG SERPL QL: NEGATIVE
HCT VFR BLD AUTO: 40 % (ref 34–46.6)
HGB BLD-MCNC: 13.2 G/DL (ref 12–15.9)
HOLD SPECIMEN: NORMAL
HOLD SPECIMEN: NORMAL
IMM GRANULOCYTES # BLD AUTO: 0.02 10*3/MM3 (ref 0–0.05)
IMM GRANULOCYTES NFR BLD AUTO: 0.3 % (ref 0–0.5)
INR PPP: 0.97 (ref 0.8–1.2)
LYMPHOCYTES # BLD AUTO: 1.84 10*3/MM3 (ref 0.7–3.1)
LYMPHOCYTES NFR BLD AUTO: 25.6 % (ref 19.6–45.3)
MAGNESIUM SERPL-MCNC: 1.8 MG/DL (ref 1.6–2.6)
MCH RBC QN AUTO: 30.1 PG (ref 26.6–33)
MCHC RBC AUTO-ENTMCNC: 33 G/DL (ref 31.5–35.7)
MCV RBC AUTO: 91.1 FL (ref 79–97)
METHADONE UR QL SCN: NEGATIVE
MONOCYTES # BLD AUTO: 0.49 10*3/MM3 (ref 0.1–0.9)
MONOCYTES NFR BLD AUTO: 6.8 % (ref 5–12)
NEUTROPHILS # BLD AUTO: 4.65 10*3/MM3 (ref 1.7–7)
NEUTROPHILS NFR BLD AUTO: 64.8 % (ref 42.7–76)
NRBC BLD AUTO-RTO: 0 /100 WBC (ref 0–0.2)
OPIATES UR QL: NEGATIVE
OXYCODONE UR QL SCN: NEGATIVE
PCP UR QL SCN: NEGATIVE
PLATELET # BLD AUTO: 326 10*3/MM3 (ref 140–450)
PMV BLD AUTO: 9.8 FL (ref 6–12)
POTASSIUM BLD-SCNC: 3.4 MMOL/L (ref 3.5–5.2)
PROPOXYPH UR QL: NEGATIVE
PROT SERPL-MCNC: 6.7 G/DL (ref 6–8.5)
PROTHROMBIN TIME: 12.7 SECONDS (ref 11.1–15.3)
RBC # BLD AUTO: 4.39 10*6/MM3 (ref 3.77–5.28)
SODIUM BLD-SCNC: 143 MMOL/L (ref 136–145)
TRICYCLICS UR QL SCN: NEGATIVE
WBC NRBC COR # BLD: 7.18 10*3/MM3 (ref 3.4–10.8)
WHOLE BLOOD HOLD SPECIMEN: NORMAL

## 2019-10-14 PROCEDURE — 84703 CHORIONIC GONADOTROPIN ASSAY: CPT | Performed by: PHYSICIAN ASSISTANT

## 2019-10-14 PROCEDURE — 93005 ELECTROCARDIOGRAM TRACING: CPT | Performed by: PHYSICIAN ASSISTANT

## 2019-10-14 PROCEDURE — 99284 EMERGENCY DEPT VISIT MOD MDM: CPT

## 2019-10-14 PROCEDURE — 85610 PROTHROMBIN TIME: CPT | Performed by: PHYSICIAN ASSISTANT

## 2019-10-14 PROCEDURE — 70450 CT HEAD/BRAIN W/O DYE: CPT

## 2019-10-14 PROCEDURE — 80053 COMPREHEN METABOLIC PANEL: CPT | Performed by: PHYSICIAN ASSISTANT

## 2019-10-14 PROCEDURE — 93010 ELECTROCARDIOGRAM REPORT: CPT | Performed by: INTERNAL MEDICINE

## 2019-10-14 PROCEDURE — 80307 DRUG TEST PRSMV CHEM ANLYZR: CPT | Performed by: PHYSICIAN ASSISTANT

## 2019-10-14 PROCEDURE — 85025 COMPLETE CBC W/AUTO DIFF WBC: CPT | Performed by: PHYSICIAN ASSISTANT

## 2019-10-14 PROCEDURE — 83735 ASSAY OF MAGNESIUM: CPT | Performed by: PHYSICIAN ASSISTANT

## 2019-10-14 PROCEDURE — 71045 X-RAY EXAM CHEST 1 VIEW: CPT

## 2019-10-14 RX ORDER — SODIUM CHLORIDE 0.9 % (FLUSH) 0.9 %
10 SYRINGE (ML) INJECTION AS NEEDED
Status: DISCONTINUED | OUTPATIENT
Start: 2019-10-14 | End: 2019-10-14 | Stop reason: HOSPADM

## 2019-10-14 NOTE — ED NOTES
Pt presents to the ED with c/o right side numbness and tingling. The patient states the more I use my right side the more pain I feel. Pt also states the left side is starting to hurt from using it more often.      Lois Pillai RN  10/14/19 [id number]

## 2019-10-14 NOTE — ED TRIAGE NOTES
Pt presents to ED with c/o right sided weakness and numbness that started four days ago and is gradually worsening.

## 2019-10-14 NOTE — ED PROVIDER NOTES
"Subjective   Patient presents to emergency department for multiple medical concerns.  States 4 days ago she developed a headache which she describes as hot molten lava ppoured over the right side of her face.  She then noticed some right upper and lower extremity tingling/weakness.  States pain radiates from the right side of her face into her right upper and lower extremities.  Also states her left extremities are starting to hurt worse from her using them more.  She is also concerned that over the past year she has had a few episodes of what she describes as seizures where she \"blacks out\".  States this has not happened recently.  Endorses chronic intermittent headaches with photophobia and nausea.  States her kids noticed that her face was pale on the right side and she states her smile seems uneven to her.          History provided by:  Patient   used: No    Extremity Weakness   Location:  Right upper and lower  Severity:  Moderate  Onset quality:  Sudden  Duration:  4 days  Timing:  Constant  Progression:  Worsening  Chronicity:  New  Associated symptoms: headaches and myalgias    Associated symptoms: no abdominal pain, no chest pain, no ear pain, no fever, no loss of consciousness, no nausea, no shortness of breath, no sore throat and no vomiting        Review of Systems   Constitutional: Negative for chills and fever.   HENT: Negative for ear pain, sore throat and trouble swallowing.    Eyes: Negative for visual disturbance.   Respiratory: Negative for shortness of breath.    Cardiovascular: Negative for chest pain.   Gastrointestinal: Negative for abdominal pain, nausea and vomiting.   Genitourinary: Negative for dysuria and flank pain.   Musculoskeletal: Positive for extremity weakness and myalgias.   Skin: Negative for color change.   Allergic/Immunologic: Negative for immunocompromised state.   Neurological: Positive for syncope, weakness, numbness and headaches. Negative for loss of " consciousness.   Hematological: Does not bruise/bleed easily.   Psychiatric/Behavioral: Negative for confusion.       Past Medical History:   Diagnosis Date   • Adjustment disorder with depressed mood     Adjustment disorder with depressed mood - on celexa and 1 week risperdal   • Costal chondritis    • Cough     Cough - improved   • Hand eczema    • Influenza    • Not vaccinated against influenza 2016    INFLUENZA IMMUN NOT ADMIN, REASON NOT DOC  (1) - MRush LOCKROBERTA ([facility])    • Rib pain    • Stress     Difficulty managing stress   • Upper respiratory infection        No Known Allergies    Past Surgical History:   Procedure Laterality Date   •  SECTION       Section (2)   • CHOLECYSTECTOMY      Cholecystectomy (1)   • CHOLECYSTECTOMY      Cholecystectomy, laparoscopic (1)   • COLONOSCOPY     • COLONOSCOPY N/A 2019    Procedure: COLONOSCOPY;  Surgeon: Obed Vieira MD;  Location: [facility] ENDOSCOPY;  Service: Gastroenterology   • CYSTOSCOPY      Cystoscopy (1)   • CYSTOSCOPY      Cystoscopy & treatment (1)   • DENTAL PROCEDURE      Dental surgery procedure (1) - wisdom teeth   • ENDOSCOPY N/A 2019    Procedure: ESOPHAGOGASTRODUODENOSCOPY;  Surgeon: Obed Vieira MD;  Location: [facility] ENDOSCOPY;  Service: Gastroenterology       Family History   Problem Relation Age of Onset   • ADD / ADHD Mother    • Diabetes Mother    • Anxiety disorder Mother    • ADD / ADHD Brother    • Heart murmur Brother    • Diabetes Maternal Grandmother    • Depression Other         Depressive disorder   • Heart disease Other    • Retinitis pigmentosa Other    • Arrhythmia Other    • Breast cancer Neg Hx    • Colon cancer Neg Hx         Colorectal cancer   • Endometrial cancer Neg Hx    • Ovarian cancer Neg Hx        Social History     Socioeconomic History   • Marital status: Legally      Spouse name: Not on file   • Number of children: Not on file   • Years of education: Not on file   •  "Highest education level: Not on file   Tobacco Use   • Smoking status: Never Smoker   • Smokeless tobacco: Never Used   Substance and Sexual Activity   • Alcohol use: Yes     Comment: rare   • Drug use: No   • Sexual activity: Yes     Partners: Male     Birth control/protection: Implant     Comment:    Social History Narrative    ** Merged History Encounter **                Objective      /85   Pulse 82   Temp 97.8 °F (36.6 °C) (Oral)   Resp 18   Ht 149.9 cm (59\")   Wt 77.1 kg (170 lb)   SpO2 93%   BMI 34.34 kg/m²     Physical Exam   Constitutional: She is oriented to person, place, and time. She appears well-developed and well-nourished. No distress.   HENT:   Head: Normocephalic and atraumatic.   Eyes: Conjunctivae are normal.   Neck: Normal range of motion. Neck supple.   Cardiovascular: Normal rate, regular rhythm, normal heart sounds and intact distal pulses.   Pulmonary/Chest: Effort normal and breath sounds normal. No respiratory distress. She has no wheezes.   Abdominal: Soft. Bowel sounds are normal. There is no tenderness.   Musculoskeletal: She exhibits no edema.   Neurological: She is alert and oriented to person, place, and time.   Skin: Skin is warm. Capillary refill takes less than 2 seconds.   Psychiatric: She has a normal mood and affect. Her behavior is normal. Thought content normal.   Nursing note and vitals reviewed.      ECG 12 Lead    Date/Time: 10/14/2019 7:42 PM  Performed by: Dustin Parikh PA-C  Authorized by: Dustin Parikh PA-C   Interpreted by physician  Comparison: compared with previous ECG from 5/4/2019  Similar to previous ECG  Rhythm: sinus rhythm  Rate: normal  BPM: 83  ST Segments: ST segments normal  Clinical impression: normal ECG                 ED Course  ED Course as of Oct 14 1943   Mon Oct 14, 2019   1906 Vitals improved without intervention.    [BETHANY]      ED Course User Index  [BETHANY] Dustin Parikh PA-C      Results for orders placed " or performed during the hospital encounter of 10/14/19   Urine Drug Screen - Urine, Clean Catch   Result Value Ref Range    THC, Screen, Urine Negative Negative    Phencyclidine (PCP), Urine Negative Negative    Cocaine Screen, Urine Negative Negative    Methamphetamine, Ur Negative Negative    Opiate Screen Negative Negative    Amphetamine Screen, Urine Negative Negative    Benzodiazepine Screen, Urine Negative Negative    Tricyclic Antidepressants Screen Negative Negative    Methadone Screen, Urine Negative Negative    Barbiturates Screen, Urine Negative Negative    Oxycodone Screen, Urine Negative Negative    Propoxyphene Screen Negative Negative    Buprenorphine, Screen, Urine Negative Negative   Comprehensive Metabolic Panel   Result Value Ref Range    Glucose 86 65 - 99 mg/dL    BUN 10 6 - 20 mg/dL    Creatinine 0.65 0.57 - 1.00 mg/dL    Sodium 143 136 - 145 mmol/L    Potassium 3.4 (L) 3.5 - 5.2 mmol/L    Chloride 106 98 - 107 mmol/L    CO2 29.0 22.0 - 29.0 mmol/L    Calcium 9.0 8.6 - 10.5 mg/dL    Total Protein 6.7 6.0 - 8.5 g/dL    Albumin 4.30 3.50 - 5.20 g/dL    ALT (SGPT) 60 (H) 1 - 33 U/L    AST (SGOT) 42 (H) 1 - 32 U/L    Alkaline Phosphatase 56 39 - 117 U/L    Total Bilirubin 0.2 0.2 - 1.2 mg/dL    eGFR Non African Amer 108 >60 mL/min/1.73    Globulin 2.4 gm/dL    A/G Ratio 1.8 g/dL    BUN/Creatinine Ratio 15.4 7.0 - 25.0    Anion Gap 8.0 5.0 - 15.0 mmol/L   Protime-INR   Result Value Ref Range    Protime 12.7 11.1 - 15.3 Seconds    INR 0.97 0.80 - 1.20   hCG, Serum, Qualitative   Result Value Ref Range    HCG Qualitative Negative Negative   CBC Auto Differential   Result Value Ref Range    WBC 7.18 3.40 - 10.80 10*3/mm3    RBC 4.39 3.77 - 5.28 10*6/mm3    Hemoglobin 13.2 12.0 - 15.9 g/dL    Hematocrit 40.0 34.0 - 46.6 %    MCV 91.1 79.0 - 97.0 fL    MCH 30.1 26.6 - 33.0 pg    MCHC 33.0 31.5 - 35.7 g/dL    RDW 12.5 12.3 - 15.4 %    RDW-SD 41.2 37.0 - 54.0 fl    MPV 9.8 6.0 - 12.0 fL    Platelets 326  140 - 450 10*3/mm3    Neutrophil % 64.8 42.7 - 76.0 %    Lymphocyte % 25.6 19.6 - 45.3 %    Monocyte % 6.8 5.0 - 12.0 %    Eosinophil % 1.9 0.3 - 6.2 %    Basophil % 0.6 0.0 - 1.5 %    Immature Grans % 0.3 0.0 - 0.5 %    Neutrophils, Absolute 4.65 1.70 - 7.00 10*3/mm3    Lymphocytes, Absolute 1.84 0.70 - 3.10 10*3/mm3    Monocytes, Absolute 0.49 0.10 - 0.90 10*3/mm3    Eosinophils, Absolute 0.14 0.00 - 0.40 10*3/mm3    Basophils, Absolute 0.04 0.00 - 0.20 10*3/mm3    Immature Grans, Absolute 0.02 0.00 - 0.05 10*3/mm3    nRBC 0.0 0.0 - 0.2 /100 WBC   Magnesium   Result Value Ref Range    Magnesium 1.8 1.6 - 2.6 mg/dL   Light Blue Top   Result Value Ref Range    Extra Tube hold for add-on    Green Top (Gel)   Result Value Ref Range    Extra Tube Hold for add-ons.    Lavender Top   Result Value Ref Range    Extra Tube hold for add-on    Gold Top - SST   Result Value Ref Range    Extra Tube Hold for add-ons.    Lavender Top   Result Value Ref Range    Extra Tube hold for add-on      Results for orders placed or performed during the hospital encounter of 10/14/19   Urine Drug Screen - Urine, Clean Catch   Result Value Ref Range    THC, Screen, Urine Negative Negative    Phencyclidine (PCP), Urine Negative Negative    Cocaine Screen, Urine Negative Negative    Methamphetamine, Ur Negative Negative    Opiate Screen Negative Negative    Amphetamine Screen, Urine Negative Negative    Benzodiazepine Screen, Urine Negative Negative    Tricyclic Antidepressants Screen Negative Negative    Methadone Screen, Urine Negative Negative    Barbiturates Screen, Urine Negative Negative    Oxycodone Screen, Urine Negative Negative    Propoxyphene Screen Negative Negative    Buprenorphine, Screen, Urine Negative Negative   Comprehensive Metabolic Panel   Result Value Ref Range    Glucose 86 65 - 99 mg/dL    BUN 10 6 - 20 mg/dL    Creatinine 0.65 0.57 - 1.00 mg/dL    Sodium 143 136 - 145 mmol/L    Potassium 3.4 (L) 3.5 - 5.2 mmol/L     Chloride 106 98 - 107 mmol/L    CO2 29.0 22.0 - 29.0 mmol/L    Calcium 9.0 8.6 - 10.5 mg/dL    Total Protein 6.7 6.0 - 8.5 g/dL    Albumin 4.30 3.50 - 5.20 g/dL    ALT (SGPT) 60 (H) 1 - 33 U/L    AST (SGOT) 42 (H) 1 - 32 U/L    Alkaline Phosphatase 56 39 - 117 U/L    Total Bilirubin 0.2 0.2 - 1.2 mg/dL    eGFR Non African Amer 108 >60 mL/min/1.73    Globulin 2.4 gm/dL    A/G Ratio 1.8 g/dL    BUN/Creatinine Ratio 15.4 7.0 - 25.0    Anion Gap 8.0 5.0 - 15.0 mmol/L   Protime-INR   Result Value Ref Range    Protime 12.7 11.1 - 15.3 Seconds    INR 0.97 0.80 - 1.20   hCG, Serum, Qualitative   Result Value Ref Range    HCG Qualitative Negative Negative   CBC Auto Differential   Result Value Ref Range    WBC 7.18 3.40 - 10.80 10*3/mm3    RBC 4.39 3.77 - 5.28 10*6/mm3    Hemoglobin 13.2 12.0 - 15.9 g/dL    Hematocrit 40.0 34.0 - 46.6 %    MCV 91.1 79.0 - 97.0 fL    MCH 30.1 26.6 - 33.0 pg    MCHC 33.0 31.5 - 35.7 g/dL    RDW 12.5 12.3 - 15.4 %    RDW-SD 41.2 37.0 - 54.0 fl    MPV 9.8 6.0 - 12.0 fL    Platelets 326 140 - 450 10*3/mm3    Neutrophil % 64.8 42.7 - 76.0 %    Lymphocyte % 25.6 19.6 - 45.3 %    Monocyte % 6.8 5.0 - 12.0 %    Eosinophil % 1.9 0.3 - 6.2 %    Basophil % 0.6 0.0 - 1.5 %    Immature Grans % 0.3 0.0 - 0.5 %    Neutrophils, Absolute 4.65 1.70 - 7.00 10*3/mm3    Lymphocytes, Absolute 1.84 0.70 - 3.10 10*3/mm3    Monocytes, Absolute 0.49 0.10 - 0.90 10*3/mm3    Eosinophils, Absolute 0.14 0.00 - 0.40 10*3/mm3    Basophils, Absolute 0.04 0.00 - 0.20 10*3/mm3    Immature Grans, Absolute 0.02 0.00 - 0.05 10*3/mm3    nRBC 0.0 0.0 - 0.2 /100 WBC   Magnesium   Result Value Ref Range    Magnesium 1.8 1.6 - 2.6 mg/dL   Light Blue Top   Result Value Ref Range    Extra Tube hold for add-on    Green Top (Gel)   Result Value Ref Range    Extra Tube Hold for add-ons.    Lavender Top   Result Value Ref Range    Extra Tube hold for add-on    Gold Top - SST   Result Value Ref Range    Extra Tube Hold for add-ons.     Lavender Top   Result Value Ref Range    Extra Tube hold for add-on      Ct Head Without Contrast    Result Date: 10/14/2019  Narrative: CT Head Without Contrast History: Right-sided weakness x4 days. CVA. Axial scans of the brain were obtained without intravenous contrast.  Coronal and sagital reconstructions were preformed. This exam was performed according to our departmental dose-optimization program, which includes automated exposure control, adjustment of the mA and/or kV according to patient size and/or use of iterative reconstruction technique. DLP: 1017.20 Comparison: May 4, 2019 Findings: Bone windows are unremarkable. The visualized paranasal sinuses are unremarkable. No hemorrhage. No mass. No abnormal areas of increased or decreased attenuation. No midline shift. No abnormal extra-axial fluid collections.     Impression: CONCLUSION: Normal nonenhanced CT of the brain 27037 Electronically signed by:  Wes Rosario MD  10/14/2019 7:05 PM CDT Workstation: [id number]    Xr Chest 1 View    Result Date: 10/14/2019  Narrative: PORTABLE CHEST HISTORY: CVA Portable AP film of the chest was obtained at 5:50 PM. COMPARISON: May 4, 2019 FINDINGS: The lungs are clear of an acute process. The heart is not enlarged. The pulmonary vasculature is not increased. No pleural effusion. No pneumothorax. No acute osseous abnormality.     Impression: CONCLUSION: Normal portable chest [id number] Electronically signed by:  Wes Rosario MD  10/14/2019 6:32 PM CDT Workstation: [id number]      Discussed results with patient.  Gave educational materials.  Advised close follow up with PCP/Neurology.  Return to emergency department for new or worsening symptoms.                NIHSS (NIH Stroke Scale/Score) reviewed and/or performed as part of the patient evaluation and treatment planning process.  The result associated with this review/performance is: 0       MDM    Final diagnoses:   Nonintractable headache, unspecified chronicity pattern,  unspecified headache type   Myalgia                  Dustin Parikh PA-C  10/14/19 1943

## 2019-10-16 ENCOUNTER — TELEPHONE (OUTPATIENT)
Dept: GASTROENTEROLOGY | Facility: CLINIC | Age: 29
End: 2019-10-16

## 2019-10-16 NOTE — TELEPHONE ENCOUNTER
Left patient a message relaying recommendations.          ----- Message from CHANDNI Clemens sent at 10/15/2019  3:59 PM CDT -----  I am not sure they are related to her migraine. She could try to get some OTC Prilosec and see if that does not cause side effect?   ----- Message -----  From: Irma León MA  Sent: 10/15/2019   1:44 PM  To: CHANDNI Clemens    Patient stopped taking both carafate and protonix as she got a  Severe headache right after taking them. Patient is now wondering if she should be taking anything different? Was seen in [facility] last night

## 2019-10-30 ENCOUNTER — TRANSCRIBE ORDERS (OUTPATIENT)
Dept: PHYSICAL THERAPY | Facility: HOSPITAL | Age: 29
End: 2019-10-30

## 2019-10-30 DIAGNOSIS — M25.511 RIGHT SHOULDER PAIN, UNSPECIFIED CHRONICITY: Primary | ICD-10-CM

## 2019-11-06 ENCOUNTER — HOSPITAL ENCOUNTER (OUTPATIENT)
Dept: PHYSICAL THERAPY | Facility: HOSPITAL | Age: 29
Setting detail: THERAPIES SERIES
Discharge: HOME | End: 2019-11-06

## 2019-11-06 DIAGNOSIS — M25.511 RIGHT SHOULDER PAIN, UNSPECIFIED CHRONICITY: Primary | ICD-10-CM

## 2019-11-06 PROCEDURE — 97162 PT EVAL MOD COMPLEX 30 MIN: CPT | Performed by: PHYSICAL THERAPIST

## 2019-11-06 NOTE — THERAPY EVALUATION
Outpatient Physical Therapy Ortho Initial Evaluation  [facility]     Patient Name: Irene Collins  : 1990  MRN: [id number]  Today's Date: 2019      Visit Date: 2019  Visit   Return to MD: LILIA  Re-cert date: 19    Patient Active Problem List   Diagnosis   • Blood in stool   • Diarrhea   • Generalized abdominal pain        Past Medical History:   Diagnosis Date   • Adjustment disorder with depressed mood     Adjustment disorder with depressed mood - on celexa and 1 week risperdal   • Costal chondritis    • Cough     Cough - improved   • Hand eczema    • Influenza    • Not vaccinated against influenza 2016    INFLUENZA IMMUN NOT ADMIN, REASON NOT DOC  (1) - CHERI LOCKROBERTA ([facility])    • Rib pain    • Stress     Difficulty managing stress   • Upper respiratory infection         Past Surgical History:   Procedure Laterality Date   •  SECTION       Section (2)   • CHOLECYSTECTOMY      Cholecystectomy (1)   • CHOLECYSTECTOMY      Cholecystectomy, laparoscopic (1)   • COLONOSCOPY     • COLONOSCOPY N/A 2019    Procedure: COLONOSCOPY;  Surgeon: Obed Vieira MD;  Location: [facility] ENDOSCOPY;  Service: Gastroenterology   • CYSTOSCOPY      Cystoscopy (1)   • CYSTOSCOPY      Cystoscopy & treatment (1)   • DENTAL PROCEDURE      Dental surgery procedure (1) - wisdom teeth   • ENDOSCOPY N/A 2019    Procedure: ESOPHAGOGASTRODUODENOSCOPY;  Surgeon: Obed Vieira MD;  Location: [facility] ENDOSCOPY;  Service: Gastroenterology       Visit Dx:     ICD-10-CM ICD-9-CM   1. Right shoulder pain, unspecified chronicity M25.511 719.41     Medications (Admitted on 2019)     aspirin 81 MG EC tablet     hydrocortisone (ANUSOL-HC) 2.5 % rectal cream     levonorgestrel (MIRENA, 52 MG,) 20 MCG/24HR IUD     Multiple Vitamin (MULTI VITAMIN DAILY PO)     nitrofurantoin, macrocrystal-monohydrate, (MACROBID) 100 MG capsule     pantoprazole (PROTONIX) 40 MG EC tablet      Probiotic Product (SOLUBLE FIBER/PROBIOTICS PO)     sertraline (ZOLOFT) 50 MG tablet     sucralfate (CARAFATE) 1 g tablet      Allergies: NKA        PT Ortho     Row Name 11/06/19 1300       Subjective Comments    Subjective Comments  28 yo female with R shoulder pain for the past month, had stroke like symptoms (facial droop, R sided weakness) at the time. Had R shoulder pain at the time.   -BS       Precautions and Contraindications    Precautions/Limitations  no known precautions/limitations  -BS       Subjective Pain    Able to rate subjective pain?  yes  -BS    Pre-Treatment Pain Level  3  -BS    Post-Treatment Pain Level  3  -BS       Posture/Observations    Posture/Observations Comments  TTP along medial border of R scapula  -BS       Quarter Clearing    Quarter Clearing  Upper Quarter Clearing  -BS       Neural Tension Signs- Upper Quarter Clearing    ULNTT 2  Bilateral:;Postive  -BS       Sensory Screen for Light Touch- Upper Quarter Clearing    C4 (posterior shoulder)  Right:;Diminished;Left:;Intact  -BS    C5 (lateral upper arm)  Right:;Diminished;Left:;Intact  -BS    C6 (tip of thumb)  Bilateral:;Intact  -BS    C7 (tip of 3rd finger)  Bilateral:;Intact  -BS    C8 (tip of 5th finger)  Bilateral:;Intact  -BS    T1 (medial lower arm)  Bilateral:;Intact  -BS       Myotomal Screen- Upper Quarter Clearing    Shoulder flexion (C5)  Bilateral:;5 (Normal)  -BS    Elbow flexion/wrist extension (C6)  Bilateral:;5 (Normal)  -BS    Elbow extension/wrist flexion (C7)  Bilateral:;5 (Normal)  -BS      Bilateral:;WNL  -BS       Cervical/Shoulder ROM Screen    Cervical flexion  Normal 55  -BS    Cervical extension  Impaired 40 deg  -BS    Cervical lateral flexion  Normal  -BS    Cervical rotation  Normal  -BS       Special Tests/Palpation    Special Tests/Palpation  Shoulder;Cervical/Thoracic  -BS       Cervical/Thoracic Special Tests    Spurlings (Foraminal Compression)  Bilateral:;Negative  -BS       Shoulder  Impingement/Rotator Cuff Special Tests    Henao-Nawaf Test (RC Lesion vs. Bursitis)  Right:;Negative  -BS    Empty Can Test (RC Lesion)  Right:;Negative  -BS    Speed's Test (LH of Biceps Lesion)  Right:;Negative  -BS       Shoulder Laxity/Instability Special Tests    Anterior Apprehension/Relocation Test, at 90 Degrees  Right:;Negative  -BS       Biceps/Labral Special Tests    [place]'s Test (Labral Test)  Right:;Negative  -BS       General ROM    GENERAL ROM COMMENTS  AROM: R shoulder-flex 150 deg (R scapula pain elicited) abd 135 deg (R scapula pain elicited) ER 65 deg (aggravated R scapula pain) IR 60 deg (R med border of scapula pain)   -BS       MMT (Manual Muscle Testing)    General MMT Comments  MMT: R shoulder-flex 5/5 abd 5/5 ER 4+/5 IR 5/5 R elbow flex/ext 5/5 L shoulder-5/5  -BS       Sensation    Light Touch  Partial deficits in the RUE paresthesia along R post sh, R lateral arm and R med forearm  -BS      User Key  (r) = Recorded By, (t) = Taken By, (c) = Cosigned By    Initials Name Provider Type    Rom Gruber, PT Physical Therapist                            PT OP Goals     Row Name 11/06/19 1300          PT Short Term Goals    STG Date to Achieve  11/27/19  -BS     STG 1  Pt indep with HEP for R shoulder/neck  -BS     STG 1 Progress  New  -BS     STG 2  Improve R shoulder ER MMT to 5/5  -BS     STG 2 Progress  New  -BS     STG 3  Improve R shoulder ER/IR AROM to 80/70 degrees with no R scapula pain elicited at endrange  -BS     STG 3 Progress  New  -BS     STG 4  Perform all job duties with 0/10 L scapula region pain  -BS     STG 4 Progress  New  -BS        Time Calculation    PT Goal Re-Cert Due Date  11/27/19  -BS       User Key  (r) = Recorded By, (t) = Taken By, (c) = Cosigned By    Initials Name Provider Type    Rom Gruber, PT Physical Therapist          PT Assessment/Plan     Row Name 11/06/19 1354          PT Assessment    Functional Limitations  Performance in work  activities;Performance in sport activities;Performance in self-care ADL;Performance in leisure activities;Limitation in home management  -BS     Impairments  Impaired flexibility;Pain;Range of motion;Sensation;Muscle strength  -BS     Assessment Comments  Acute R scapula/R shoulder region pain due to suspected cervical DDD.  -BS     Please refer to paper survey for additional self-reported information  Yes  -BS     Rehab Potential  Good  -BS     Patient/caregiver participated in establishment of treatment plan and goals  Yes  -BS     Patient would benefit from skilled therapy intervention  Yes  -BS        PT Plan    PT Frequency  2x/week  -BS     Predicted Duration of Therapy Intervention (Therapy Eval)  3 weeks  -BS     Planned CPT's?  PT EVAL LOW COMPLEXITY: 45036;PT RE-EVAL: 58880;PT THER PROC EA 15 MIN: 33241;PT MANUAL THERAPY EA 15 MIN: 65031;PT ELECTRICAL STIM UNATTEND: ;PT TRACTION CERVICAL: 59291;PT HOT/COLD PACK WC NONMCARE: 84575;PT THER SUPP EA 15 MIN  -BS     Physical Therapy Interventions (Optional Details)  home exercise program;joint mobilization;manual therapy techniques;modalities;patient/family education;postural re-education;ROM (Range of Motion);strengthening;stretching  -BS     PT Plan Comments  address cervical retractions/extensions statically or with repeated movements  to rule out cervical spine involvement.   -BS       User Key  (r) = Recorded By, (t) = Taken By, (c) = Cosigned By    Initials Name Provider Type    Rom Gruber, PT Physical Therapist            OP Exercises     Row Name 11/06/19 1300             Subjective Comments    Subjective Comments  30 yo female with R shoulder pain for the past month, had stroke like symptoms (facial droop, R sided weakness) at the time. Had R shoulder pain at the time.   -BS         Subjective Pain    Able to rate subjective pain?  yes  -BS      Pre-Treatment Pain Level  3  -BS      Post-Treatment Pain Level  3  -BS        User Key  (r) =  Recorded By, (t) = Taken By, (c) = Cosigned By    Initials Name Provider Type    BS Rom Basurto, PT Physical Therapist                        Outcome Measure Options: Quick DASH  Quick DASH  Open a tight or new jar.: Severe Difficulty  Do heavy household chores (e.g., wash walls, wash floors): Mild Difficulty  Carry a shopping bag or briefcase: Moderate Difficulty  Wash your back: Mild Difficulty  Use a knife to cut food: Mild Difficulty  Recreational activities in which you take some force or impact through your arm, should or hand (e.g. golf, hammering, tennis, etc.): Severe Difficulty  During the past week, to what extent has your arm, shoulder, or hand problem interfered with your normal social activites with family, friends, neighbors or groups?: Slightly  During the past week, were you limited in your work or other regular daily activities as a result of your arm, shoulder or hand problem?: Moderately Limited  Arm, Shoulder, or hand pain: Moderate  Tingling (pins and needles) in your arm, shoulder, or hand: Mild  During the past week, how much difficulty have you had sleeping because of the pain in your arm, shoulder or hand?: Mild Difficulty  Number of Questions Answered: 11  Quick DASH Score: 40.91         Time Calculation:     Start Time: 1354  Stop Time: 1424  Time Calculation (min): 30 min  Total Timed Code Minutes- PT: 30 minute(s)     Therapy Charges for Today     Code Description Service Date Service Provider Modifiers Qty    49803506988 HC PT EVAL MOD COMPLEXITY 2 11/6/2019 Rom Basurto, PT GP 1          PT G-Codes  Outcome Measure Options: Quick DASH  Quick DASH Score: 40.91         Rom Basurto PT  11/6/2019

## 2019-11-19 ENCOUNTER — OFFICE VISIT (OUTPATIENT)
Dept: GASTROENTEROLOGY | Facility: CLINIC | Age: 29
End: 2019-11-19

## 2019-11-19 VITALS
DIASTOLIC BLOOD PRESSURE: 62 MMHG | HEIGHT: 59 IN | WEIGHT: 164.2 LBS | HEART RATE: 82 BPM | BODY MASS INDEX: 33.1 KG/M2 | SYSTOLIC BLOOD PRESSURE: 108 MMHG

## 2019-11-19 DIAGNOSIS — K58.0 IRRITABLE BOWEL SYNDROME WITH DIARRHEA: ICD-10-CM

## 2019-11-19 DIAGNOSIS — K76.0 FATTY LIVER: ICD-10-CM

## 2019-11-19 DIAGNOSIS — K21.00 GASTROESOPHAGEAL REFLUX DISEASE WITH ESOPHAGITIS: Primary | ICD-10-CM

## 2019-11-19 PROBLEM — K58.9 IRRITABLE BOWEL SYNDROME: Status: ACTIVE | Noted: 2019-11-19

## 2019-11-19 PROBLEM — D75.839 THROMBOCYTOSIS: Status: ACTIVE | Noted: 2019-11-19

## 2019-11-19 PROCEDURE — 99213 OFFICE O/P EST LOW 20 MIN: CPT | Performed by: NURSE PRACTITIONER

## 2019-11-19 RX ORDER — OMEPRAZOLE 40 MG/1
40 CAPSULE, DELAYED RELEASE ORAL DAILY
Qty: 30 CAPSULE | Refills: 3 | Status: SHIPPED | OUTPATIENT
Start: 2019-11-19 | End: 2020-02-19 | Stop reason: SDUPTHER

## 2019-11-19 NOTE — PROGRESS NOTES
Chief Complaint   Patient presents with   • Irritable Bowel Syndrome   • Heartburn       Subjective    Irene Collins is a 29 y.o. female. she is here today for follow-up.    History of Present Illness  29-year-old female presents for follow-up regarding reflux and irritable bowel syndrome.  States she was not able to tolerate Protonix and Carafate and discontinue those medications.  Still has some intermittent cramping and abdominal pain.  States she only eats about once a day and cannot lose weight.  Reports Anusol cream has significantly improved rectal itching and pain.  Only uses it on an as-needed basis.       The following portions of the patient's history were reviewed and updated as appropriate:   Past Medical History:   Diagnosis Date   • Adjustment disorder with depressed mood     Adjustment disorder with depressed mood - on celexa and 1 week risperdal   • Costal chondritis    • Cough     Cough - improved   • Hand eczema    • Influenza    • Not vaccinated against influenza 2016    INFLUENZA IMMUN NOT ADMIN, REASON NOT DOC  (1) - CHERI GRANADOS ([facility])    • Rib pain    • Stress     Difficulty managing stress   • Upper respiratory infection      Past Surgical History:   Procedure Laterality Date   •  SECTION       Section (2)   • CHOLECYSTECTOMY      Cholecystectomy (1)   • CHOLECYSTECTOMY      Cholecystectomy, laparoscopic (1)   • COLONOSCOPY     • COLONOSCOPY N/A 2019    Procedure: COLONOSCOPY;  Surgeon: Obed Vieira MD;  Location: [facility] ENDOSCOPY;  Service: Gastroenterology   • CYSTOSCOPY      Cystoscopy (1)   • CYSTOSCOPY      Cystoscopy & treatment (1)   • DENTAL PROCEDURE      Dental surgery procedure (1) - wisdom teeth   • ENDOSCOPY N/A 2019    Procedure: ESOPHAGOGASTRODUODENOSCOPY;  Surgeon: Obed Vieira MD;  Location: [facility] ENDOSCOPY;  Service: Gastroenterology     Family History   Problem Relation Age of Onset   • ADD / ADHD Mother    • Diabetes  Mother    • Anxiety disorder Mother    • ADD / ADHD Brother    • Heart murmur Brother    • Diabetes Maternal Grandmother    • Depression Other         Depressive disorder   • Heart disease Other    • Retinitis pigmentosa Other    • Arrhythmia Other    • Breast cancer Neg Hx    • Colon cancer Neg Hx         Colorectal cancer   • Endometrial cancer Neg Hx    • Ovarian cancer Neg Hx      OB History     No data available        Prior to Admission medications    Medication Sig Start Date End Date Taking? Authorizing Provider   aspirin 81 MG EC tablet Take 81 mg by mouth Daily.   Yes Emergency, Nurse Demarco RN   hydrocortisone (ANUSOL-HC) 2.5 % rectal cream Insert  into the rectum 2 (Two) Times a Day As Needed for Hemorrhoids (rectal discomfort). 9/24/19  Yes Katia Gibbs APRN   levonorgestrel (MIRENA, 52 MG,) 20 MCG/24HR IUD 1 each by Intrauterine route 1 (one) time.   Yes ProviderLing MD   Multiple Vitamin (MULTI VITAMIN DAILY PO) Take 1 tablet by mouth Daily.   Yes Ling Stanley MD   Probiotic Product (SOLUBLE FIBER/PROBIOTICS PO) Take  by mouth.   Yes ProviderLing MD   sertraline (ZOLOFT) 50 MG tablet Take 50 mg by mouth Daily.   Yes Emergency, Nurse Demarco, RN   nitrofurantoin, macrocrystal-monohydrate, (MACROBID) 100 MG capsule Macrobid 100 mg capsule   Take 1 capsule every day by oral route.  11/19/19  Ling Stanley MD   pantoprazole (PROTONIX) 40 MG EC tablet Take 1 tablet by mouth Daily. 9/24/19 11/19/19  Katia Gibbs APRN   sucralfate (CARAFATE) 1 g tablet Take 1 tablet by mouth 4 (Four) Times a Day. 9/24/19 11/19/19  Katia Gibbs APRN     No Known Allergies  Social History     Socioeconomic History   • Marital status: Legally      Spouse name: Not on file   • Number of children: Not on file   • Years of education: Not on file   • Highest education level: Not on file   Tobacco Use   • Smoking status: Never Smoker   • Smokeless tobacco: Never Used   Substance and Sexual  "Activity   • Alcohol use: Yes     Comment: rare   • Drug use: No   • Sexual activity: Yes     Partners: Male     Birth control/protection: Implant     Comment:    Social History Narrative    ** Merged History Encounter **            Review of Systems  Review of Systems   Constitutional: Positive for fatigue. Negative for activity change, appetite change, chills, diaphoresis, fever and unexpected weight change.   HENT: Negative for sore throat and trouble swallowing.    Respiratory: Negative for shortness of breath.    Gastrointestinal: Positive for abdominal pain, constipation (bowel habits alternate), diarrhea and rectal pain (much better with anusol ). Negative for abdominal distention, anal bleeding, blood in stool, nausea and vomiting.   Musculoskeletal: Negative for arthralgias.   Skin: Negative for pallor.   Neurological: Negative for light-headedness.        /62 (BP Location: Left arm)   Pulse 82   Ht 149.9 cm (59\")   Wt 74.5 kg (164 lb 3.2 oz)   BMI 33.16 kg/m²     Objective    Physical Exam   Constitutional: She is oriented to person, place, and time. She appears well-developed and well-nourished. She is cooperative. No distress.   HENT:   Head: Normocephalic and atraumatic.   Neck: Normal range of motion. Neck supple. No thyromegaly present.   Cardiovascular: Normal rate, regular rhythm and normal heart sounds.   Pulmonary/Chest: Effort normal and breath sounds normal. She has no wheezes. She has no rhonchi. She has no rales.   Abdominal: Soft. Normal appearance and bowel sounds are normal. She exhibits no distension. There is no hepatosplenomegaly. There is tenderness in the periumbilical area. There is no rigidity and no guarding. No hernia.   Lymphadenopathy:     She has no cervical adenopathy.   Neurological: She is alert and oriented to person, place, and time.   Skin: Skin is warm, dry and intact. No rash noted. No pallor.   Psychiatric: She has a normal mood and affect. Her speech " is normal.     Admission on 10/14/2019, Discharged on 10/14/2019   Component Date Value Ref Range Status   • THC, Screen, Urine 10/14/2019 Negative  Negative Final   • Phencyclidine (PCP), Urine 10/14/2019 Negative  Negative Final   • Cocaine Screen, Urine 10/14/2019 Negative  Negative Final   • Methamphetamine, Ur 10/14/2019 Negative  Negative Final   • Opiate Screen 10/14/2019 Negative  Negative Final   • Amphetamine Screen, Urine 10/14/2019 Negative  Negative Final   • Benzodiazepine Screen, Urine 10/14/2019 Negative  Negative Final   • Tricyclic Antidepressants Screen 10/14/2019 Negative  Negative Final   • Methadone Screen, Urine 10/14/2019 Negative  Negative Final   • Barbiturates Screen, Urine 10/14/2019 Negative  Negative Final   • Oxycodone Screen, Urine 10/14/2019 Negative  Negative Final   • Propoxyphene Screen 10/14/2019 Negative  Negative Final   • Buprenorphine, Screen, Urine 10/14/2019 Negative  Negative Final   • Glucose 10/14/2019 86  65 - 99 mg/dL Final   • BUN 10/14/2019 10  6 - 20 mg/dL Final   • Creatinine 10/14/2019 0.65  0.57 - 1.00 mg/dL Final   • Sodium 10/14/2019 143  136 - 145 mmol/L Final   • Potassium 10/14/2019 3.4* 3.5 - 5.2 mmol/L Final   • Chloride 10/14/2019 106  98 - 107 mmol/L Final   • CO2 10/14/2019 29.0  22.0 - 29.0 mmol/L Final   • Calcium 10/14/2019 9.0  8.6 - 10.5 mg/dL Final   • Total Protein 10/14/2019 6.7  6.0 - 8.5 g/dL Final   • Albumin 10/14/2019 4.30  3.50 - 5.20 g/dL Final   • ALT (SGPT) 10/14/2019 60* 1 - 33 U/L Final   • AST (SGOT) 10/14/2019 42* 1 - 32 U/L Final   • Alkaline Phosphatase 10/14/2019 56  39 - 117 U/L Final   • Total Bilirubin 10/14/2019 0.2  0.2 - 1.2 mg/dL Final   • eGFR Non African Amer 10/14/2019 108  >60 mL/min/1.73 Final   • Globulin 10/14/2019 2.4  gm/dL Final   • A/G Ratio 10/14/2019 1.8  g/dL Final   • BUN/Creatinine Ratio 10/14/2019 15.4  7.0 - 25.0 Final   • Anion Gap 10/14/2019 8.0  5.0 - 15.0 mmol/L Final   • Protime 10/14/2019 12.7  11.1  - 15.3 Seconds Final   • INR 10/14/2019 0.97  0.80 - 1.20 Final   • HCG Qualitative 10/14/2019 Negative  Negative Final   • WBC 10/14/2019 7.18  3.40 - 10.80 10*3/mm3 Final   • RBC 10/14/2019 4.39  3.77 - 5.28 10*6/mm3 Final   • Hemoglobin 10/14/2019 13.2  12.0 - 15.9 g/dL Final   • Hematocrit 10/14/2019 40.0  34.0 - 46.6 % Final   • MCV 10/14/2019 91.1  79.0 - 97.0 fL Final   • MCH 10/14/2019 30.1  26.6 - 33.0 pg Final   • MCHC 10/14/2019 33.0  31.5 - 35.7 g/dL Final   • RDW 10/14/2019 12.5  12.3 - 15.4 % Final   • RDW-SD 10/14/2019 41.2  37.0 - 54.0 fl Final   • MPV 10/14/2019 9.8  6.0 - 12.0 fL Final   • Platelets 10/14/2019 326  140 - 450 10*3/mm3 Final   • Neutrophil % 10/14/2019 64.8  42.7 - 76.0 % Final   • Lymphocyte % 10/14/2019 25.6  19.6 - 45.3 % Final   • Monocyte % 10/14/2019 6.8  5.0 - 12.0 % Final   • Eosinophil % 10/14/2019 1.9  0.3 - 6.2 % Final   • Basophil % 10/14/2019 0.6  0.0 - 1.5 % Final   • Immature Grans % 10/14/2019 0.3  0.0 - 0.5 % Final   • Neutrophils, Absolute 10/14/2019 4.65  1.70 - 7.00 10*3/mm3 Final   • Lymphocytes, Absolute 10/14/2019 1.84  0.70 - 3.10 10*3/mm3 Final   • Monocytes, Absolute 10/14/2019 0.49  0.10 - 0.90 10*3/mm3 Final   • Eosinophils, Absolute 10/14/2019 0.14  0.00 - 0.40 10*3/mm3 Final   • Basophils, Absolute 10/14/2019 0.04  0.00 - 0.20 10*3/mm3 Final   • Immature Grans, Absolute 10/14/2019 0.02  0.00 - 0.05 10*3/mm3 Final   • nRBC 10/14/2019 0.0  0.0 - 0.2 /100 WBC Final   • Extra Tube 10/14/2019 hold for add-on   Final    Auto resulted   • Extra Tube 10/14/2019 Hold for add-ons.   Final    Auto resulted.   • Extra Tube 10/14/2019 hold for add-on   Final    Auto resulted   • Extra Tube 10/14/2019 Hold for add-ons.   Final    Auto resulted.   • Magnesium 10/14/2019 1.8  1.6 - 2.6 mg/dL Final   • Extra Tube 10/14/2019 hold for add-on   Final    Auto resulted     Assessment/Plan      1. Gastroesophageal reflux disease with esophagitis    2. Irritable bowel syndrome  with diarrhea    3. Fatty liver    .   Start patient on Prilosec discussed importance of standard antireflux measures and avoidance of gastric irritants.  Discussed diet modifications for irritable bowel syndrome.  Her bowel movements have alternated between constipation diarrhea so she will just proceed with dietary modifications for now.  Recommend weight loss due to fatty infiltration of the liver we will recheck hepatic enzymes at follow-up.    Orders placed during this encounter include:  No orders of the defined types were placed in this encounter.      * Surgery not found *    Review and/or summary of lab tests, radiology, procedures, medications. Review and summary of old records and obtaining of history. The risks and benefits of my recommendations, as well as other treatment options were discussed with the patient today. Questions were answered.    New Medications Ordered This Visit   Medications   • omeprazole (priLOSEC) 40 MG capsule     Sig: Take 1 capsule by mouth Daily.     Dispense:  30 capsule     Refill:  3       Follow-up: Return in about 3 months (around 2/19/2020).          This document has been electronically signed by CHANDNI Murray on November 19, 2019 10:25 AM             Results for orders placed or performed during the hospital encounter of 10/14/19   Gold Top - SST   Result Value Ref Range    Extra Tube Hold for add-ons.    Green Top (Gel)   Result Value Ref Range    Extra Tube Hold for add-ons.    CBC Auto Differential   Result Value Ref Range    WBC 7.18 3.40 - 10.80 10*3/mm3    RBC 4.39 3.77 - 5.28 10*6/mm3    Hemoglobin 13.2 12.0 - 15.9 g/dL    Hematocrit 40.0 34.0 - 46.6 %    MCV 91.1 79.0 - 97.0 fL    MCH 30.1 26.6 - 33.0 pg    MCHC 33.0 31.5 - 35.7 g/dL    RDW 12.5 12.3 - 15.4 %    RDW-SD 41.2 37.0 - 54.0 fl    MPV 9.8 6.0 - 12.0 fL    Platelets 326 140 - 450 10*3/mm3    Neutrophil % 64.8 42.7 - 76.0 %    Lymphocyte % 25.6 19.6 - 45.3 %    Monocyte % 6.8 5.0 - 12.0 %     Eosinophil % 1.9 0.3 - 6.2 %    Basophil % 0.6 0.0 - 1.5 %    Immature Grans % 0.3 0.0 - 0.5 %    Neutrophils, Absolute 4.65 1.70 - 7.00 10*3/mm3    Lymphocytes, Absolute 1.84 0.70 - 3.10 10*3/mm3    Monocytes, Absolute 0.49 0.10 - 0.90 10*3/mm3    Eosinophils, Absolute 0.14 0.00 - 0.40 10*3/mm3    Basophils, Absolute 0.04 0.00 - 0.20 10*3/mm3    Immature Grans, Absolute 0.02 0.00 - 0.05 10*3/mm3    nRBC 0.0 0.0 - 0.2 /100 WBC   Lavender Top   Result Value Ref Range    Extra Tube hold for add-on    Lavender Top   Result Value Ref Range    Extra Tube hold for add-on    Light Blue Top   Result Value Ref Range    Extra Tube hold for add-on    Urine Drug Screen - Urine, Clean Catch   Result Value Ref Range    THC, Screen, Urine Negative Negative    Phencyclidine (PCP), Urine Negative Negative    Cocaine Screen, Urine Negative Negative    Methamphetamine, Ur Negative Negative    Opiate Screen Negative Negative    Amphetamine Screen, Urine Negative Negative    Benzodiazepine Screen, Urine Negative Negative    Tricyclic Antidepressants Screen Negative Negative    Methadone Screen, Urine Negative Negative    Barbiturates Screen, Urine Negative Negative    Oxycodone Screen, Urine Negative Negative    Propoxyphene Screen Negative Negative    Buprenorphine, Screen, Urine Negative Negative   Protime-INR   Result Value Ref Range    Protime 12.7 11.1 - 15.3 Seconds    INR 0.97 0.80 - 1.20   hCG, Serum, Qualitative   Result Value Ref Range    HCG Qualitative Negative Negative   Magnesium   Result Value Ref Range    Magnesium 1.8 1.6 - 2.6 mg/dL   Comprehensive Metabolic Panel   Result Value Ref Range    Glucose 86 65 - 99 mg/dL    BUN 10 6 - 20 mg/dL    Creatinine 0.65 0.57 - 1.00 mg/dL    Sodium 143 136 - 145 mmol/L    Potassium 3.4 (L) 3.5 - 5.2 mmol/L    Chloride 106 98 - 107 mmol/L    CO2 29.0 22.0 - 29.0 mmol/L    Calcium 9.0 8.6 - 10.5 mg/dL    Total Protein 6.7 6.0 - 8.5 g/dL    Albumin 4.30 3.50 - 5.20 g/dL    ALT (SGPT)  60 (H) 1 - 33 U/L    AST (SGOT) 42 (H) 1 - 32 U/L    Alkaline Phosphatase 56 39 - 117 U/L    Total Bilirubin 0.2 0.2 - 1.2 mg/dL    eGFR Non African Amer 108 >60 mL/min/1.73    Globulin 2.4 gm/dL    A/G Ratio 1.8 g/dL    BUN/Creatinine Ratio 15.4 7.0 - 25.0    Anion Gap 8.0 5.0 - 15.0 mmol/L   Results for orders placed or performed during the hospital encounter of 08/12/19   Tissue Pathology Exam   Result Value Ref Range    Case Report       Surgical Pathology Report                         Case: [id number]                                  Authorizing Provider:  Obed Vieira MD        Collected:           08/12/2019 11:11 AM          Ordering Location:     [facility]             Received:            08/12/2019 01:11 PM                                 [facility]                                                     Pathologist:           Tyron Trent MD                                                         Specimens:   1) - Small Intestine, Duodenum, small bowel   cold bx                                               2) - Gastric, Antrum, antrum   cold bx                                                              3) - Esophagus, Distal, distal esophagus   cold bx                                                  4) - Small Intestine, Ileum, terminal ileum   cold bx                                               5) - Large Intestine, colonic mucosa   cold bx                                             Final Diagnosis       1.  MUCOSA, DUODENUM:  NO SIGNIFICANT HISTOLOGIC ABNORMALITY.    2.  MUCOSA, ANTRUM OF STOMACH:  REACTIVE GASTROPATHY.    3.  MUCOSA, DISTAL ESOPHAGUS:  REACTIVE CHANGE OF SQUAMOUS MUCOSA.    4.  MUCOSA, TERMINAL ILEUM:  NO SIGNIFICANT HISTOLOGIC ABNORMALITY.    5.  MUCOSA, COLON:  NO SIGNIFICANT HISTOLOGIC ABNORMALITY.      Gross Description       Received for examination are 5 containers, each of which have nodular bits of white soft tissue measuring 0.3-0.5 cc in  aggregate.  All specimens are embedded as labeled.  1A duodenum; 2A antrum of stomach; 3A distal esophagus; 4A terminal ileum; 5A mucosa of colon.     Results for orders placed or performed in visit on 07/03/19   hCG, Serum, Qualitative   Result Value Ref Range    HCG Qualitative Negative Negative   Results for orders placed or performed during the hospital encounter of 05/04/19   Gold Top - SST   Result Value Ref Range    Extra Tube Hold for add-ons.    Green Top (Gel)   Result Value Ref Range    Extra Tube Hold for add-ons.    Urinalysis With Microscopic If Indicated (No Culture) - Urine, Clean Catch   Result Value Ref Range    Color, UA Yellow Yellow, Straw, Dark Yellow, Nadja    Appearance, UA Cloudy (A) Clear    pH, UA 5.5 5.0 - 9.0    Specific Gravity, UA 1.021 1.003 - 1.030    Glucose, UA Negative Negative    Ketones, UA 80 mg/dL (3+) (A) Negative    Bilirubin, UA Small (1+) (A) Negative    Blood, UA Negative Negative    Protein, UA Negative Negative    Leuk Esterase, UA Negative Negative    Nitrite, UA Negative Negative    Urobilinogen, UA 0.2 E.U./dL 0.2 - 1.0 E.U./dL   CBC Auto Differential   Result Value Ref Range    WBC 9.86 3.40 - 10.80 10*3/mm3    RBC 4.65 3.77 - 5.28 10*6/mm3    Hemoglobin 14.0 12.0 - 15.9 g/dL    Hematocrit 42.0 34.0 - 46.6 %    MCV 90.3 79.0 - 97.0 fL    MCH 30.1 26.6 - 33.0 pg    MCHC 33.3 31.5 - 35.7 g/dL    RDW 13.2 12.3 - 15.4 %    RDW-SD 43.5 37.0 - 54.0 fl    MPV 9.3 6.0 - 12.0 fL    Platelets 335 140 - 450 10*3/mm3    Neutrophil % 64.6 42.7 - 76.0 %    Lymphocyte % 24.0 19.6 - 45.3 %    Monocyte % 8.4 5.0 - 12.0 %    Eosinophil % 2.0 0.3 - 6.2 %    Basophil % 0.6 0.0 - 1.5 %    Immature Grans % 0.4 0.0 - 0.5 %    Neutrophils, Absolute 6.36 1.70 - 7.00 10*3/mm3    Lymphocytes, Absolute 2.37 0.70 - 3.10 10*3/mm3    Monocytes, Absolute 0.83 0.10 - 0.90 10*3/mm3    Eosinophils, Absolute 0.20 0.00 - 0.40 10*3/mm3    Basophils, Absolute 0.06 0.00 - 0.20 10*3/mm3    Immature Grans,  Absolute 0.04 0.00 - 0.05 10*3/mm3    nRBC 0.0 0.0 - 0.2 /100 WBC     *Note: Due to a large number of results and/or encounters for the requested time period, some results have not been displayed. A complete set of results can be found in Results Review.

## 2019-11-19 NOTE — PATIENT INSTRUCTIONS
"BMI for Adults    Body mass index (BMI) is a number that is calculated from a person's weight and height. BMI may help to estimate how much of a person's weight is composed of fat. BMI can help identify those who may be at higher risk for certain medical problems.  How is BMI used with adults?  BMI is used as a screening tool to identify possible weight problems. It is used to check whether a person is obese, overweight, healthy weight, or underweight.  How is BMI calculated?  BMI measures your weight and compares it to your height. This can be done either in English ([place] or metric measurements. Note that charts are available to help you find your BMI quickly and easily without having to do these calculations yourself.  To calculate your BMI in English ([place] measurements, your health care provider will:  1. Measure your weight in pounds (lb).  2. Multiply the number of pounds by 703.  ? For example, for a person who weighs 180 lb, multiply that number by 703, which equals 126,540.  3. Measure your height in inches (in). Then multiply that number by itself to get a measurement called \"inches squared.\"  ? For example, for a person who is 70 in tall, the \"inches squared\" measurement is 70 in x 70 in, which equals 4900 inches squared.  4. Divide the total from Step 2 (number of lb x 703) by the total from Step 3 (inches squared): 126,540 ÷ 4900 = 25.8. This is your BMI.  To calculate your BMI in metric measurements, your health care provider will:  1. Measure your weight in kilograms (kg).  2. Measure your height in meters (m). Then multiply that number by itself to get a measurement called \"meters squared.\"  ? For example, for a person who is 1.75 m tall, the \"meters squared\" measurement is 1.75 m x 1.75 m, which is equal to 3.1 meters squared.  3. Divide the number of kilograms (your weight) by the meters squared number. In this example: 70 ÷ 3.1 = 22.6. This is your BMI.  How is BMI interpreted?  To interpret your " results, your health care provider will use BMI charts to identify whether you are underweight, normal weight, overweight, or obese. The following guidelines will be used:  · Underweight: BMI less than 18.5.  · Normal weight: BMI between 18.5 and 24.9.  · Overweight: BMI between 25 and 29.9.  · Obese: BMI of 30 and above.  Please note:  · Weight includes both fat and muscle, so someone with a muscular build, such as an athlete, may have a BMI that is higher than 24.9. In cases like these, BMI is not an accurate measure of body fat.  · To determine if excess body fat is the cause of a BMI of 25 or higher, further assessments may need to be done by a health care provider.  · BMI is usually interpreted in the same way for men and women.  Why is BMI a useful tool?  BMI is useful in two ways:  · Identifying a weight problem that may be related to a medical condition, or that may increase the risk for medical problems.  · Promoting lifestyle and diet changes in order to reach a healthy weight.  Summary  · Body mass index (BMI) is a number that is calculated from a person's weight and height.  · BMI may help to estimate how much of a person's weight is composed of fat. BMI can help identify those who may be at higher risk for certain medical problems.  · BMI can be measured using English measurements or metric measurements.  · To interpret your results, your health care provider will use BMI charts to identify whether you are underweight, normal weight, overweight, or obese.  This information is not intended to replace advice given to you by your health care provider. Make sure you discuss any questions you have with your health care provider.    Food Choices for Gastroesophageal Reflux Disease, Adult  When you have gastroesophageal reflux disease (GERD), the foods you eat and your eating habits are very important. Choosing the right foods can help ease your discomfort. Think about working with a nutrition specialist  (dietitian) to help you make good choices.  What are tips for following this plan?    Meals  · Choose healthy foods that are low in fat, such as fruits, vegetables, whole grains, low-fat dairy products, and lean meat, fish, and poultry.  · Eat small meals often instead of 3 large meals a day. Eat your meals slowly, and in a place where you are relaxed. Avoid bending over or lying down until 2-3 hours after eating.  · Avoid eating meals 2-3 hours before bed.  · Avoid drinking a lot of liquid with meals.  · Cook foods using methods other than frying. Bake, grill, or broil food instead.  · Avoid or limit:  ? Chocolate.  ? Peppermint or spearmint.  ? Alcohol.  ? Pepper.  ? Black and decaffeinated coffee.  ? Black and decaffeinated tea.  ? Bubbly (carbonated) soft drinks.  ? Caffeinated energy drinks and soft drinks.  · Limit high-fat foods such as:  ? Fatty meat or fried foods.  ? Whole milk, cream, butter, or ice cream.  ? Nuts and nut butters.  ? Pastries, donuts, and sweets made with butter or shortening.  · Avoid foods that cause symptoms. These foods may be different for everyone. Common foods that cause symptoms include:  ? Tomatoes.  ? Oranges, giuseppe, and limes.  ? Peppers.  ? Spicy food.  ? Onions and garlic.  ? Vinegar.  Lifestyle  · Maintain a healthy weight. Ask your doctor what weight is healthy for you. If you need to lose weight, work with your doctor to do so safely.  · Exercise for at least 30 minutes for 5 or more days each week, or as told by your doctor.  · Wear loose-fitting clothes.  · Do not smoke. If you need help quitting, ask your doctor.  · Sleep with the head of your bed higher than your feet. Use a wedge under the mattress or blocks under the bed frame to raise the head of the bed.  Summary  · When you have gastroesophageal reflux disease (GERD), food and lifestyle choices are very important in easing your symptoms.  · Eat small meals often instead of 3 large meals a day. Eat your meals  slowly, and in a place where you are relaxed.  · Limit high-fat foods such as fatty meat or fried foods.  · Avoid bending over or lying down until 2-3 hours after eating.  · Avoid peppermint and spearmint, caffeine, alcohol, and chocolate.  This information is not intended to replace advice given to you by your health care provider. Make sure you discuss any questions you have with your health care provider.  Document Released: 06/18/2013 Document Revised: 01/23/2018 Document Reviewed: 01/23/2018  [employer] Patient Education © 2019 [employer]  Document Released: 08/29/2005 Document Revised: 10/31/2018 Document Reviewed: 10/31/2018  [employer] Patient Education © 2019 [employer]

## 2019-11-26 ENCOUNTER — HOSPITAL ENCOUNTER (OUTPATIENT)
Dept: PHYSICAL THERAPY | Facility: HOSPITAL | Age: 29
Setting detail: THERAPIES SERIES
Discharge: HOME | End: 2019-11-26

## 2019-11-26 DIAGNOSIS — M25.511 RIGHT SHOULDER PAIN, UNSPECIFIED CHRONICITY: Primary | ICD-10-CM

## 2019-11-26 PROCEDURE — 97012 MECHANICAL TRACTION THERAPY: CPT

## 2019-11-26 PROCEDURE — 97140 MANUAL THERAPY 1/> REGIONS: CPT

## 2019-11-26 PROCEDURE — G0283 ELEC STIM OTHER THAN WOUND: HCPCS

## 2019-11-26 NOTE — THERAPY TREATMENT NOTE
Outpatient Physical Therapy Ortho Treatment Note  [facility]     Patient Name: Irene Collins  : 1990  MRN: [id number]  Today's Date: 2019      Visit Date: 2019     Subjective Improvement 0  Visits 2/2  Visits approved 6 visits from 2019 to 2019  RTMD PRN  recert Date 2019    Cervical and right shoulder pain    Visit Dx:    ICD-10-CM ICD-9-CM   1. Right shoulder pain, unspecified chronicity M25.511 719.41       Patient Active Problem List   Diagnosis   • Blood in stool   • Diarrhea   • Generalized abdominal pain   • Fatty liver   • Irritable bowel syndrome   • Thrombocytosis (CMS/HCC)        Past Medical History:   Diagnosis Date   • Adjustment disorder with depressed mood     Adjustment disorder with depressed mood - on celexa and 1 week risperdal   • Costal chondritis    • Cough     Cough - improved   • Hand eczema    • Influenza    • Not vaccinated against influenza 2016    INFLUENZA IMMUN NOT ADMIN, REASON NOT DOC  (1) - CHERI GRANADOS (P)    • Rib pain    • Stress     Difficulty managing stress   • Upper respiratory infection         Past Surgical History:   Procedure Laterality Date   •  SECTION       Section (2)   • CHOLECYSTECTOMY      Cholecystectomy (1)   • CHOLECYSTECTOMY      Cholecystectomy, laparoscopic (1)   • COLONOSCOPY     • COLONOSCOPY N/A 2019    Procedure: COLONOSCOPY;  Surgeon: Obed Vieira MD;  Location: [facility] ENDOSCOPY;  Service: Gastroenterology   • CYSTOSCOPY      Cystoscopy (1)   • CYSTOSCOPY      Cystoscopy & treatment (1)   • DENTAL PROCEDURE      Dental surgery procedure (1) - wisdom teeth   • ENDOSCOPY N/A 2019    Procedure: ESOPHAGOGASTRODUODENOSCOPY;  Surgeon: Obed Vieira MD;  Location: [facility] ENDOSCOPY;  Service: Gastroenterology                       PT Assessment/Plan     Row Name 19 3120          PT Assessment    Assessment Comments  Patient did voice decrease pain with  manual cervical traction however after mechanical cervical traction, she did reports some numbness in left hand.  -CP        PT Plan    PT Frequency  2x/week  -CP     Predicted Duration of Therapy Intervention (Therapy Eval)  3 weeks  -CP     PT Plan Comments  Cont with POC.  Monitor response to mechanical traction  -CP       User Key  (r) = Recorded By, (t) = Taken By, (c) = Cosigned By    Initials Name Provider Type    Wanda Monroe PTA Physical Therapy Assistant          Modalities     Row Name 11/26/19 1200             Ice    Ice Applied  Yes  -CP      Location  cspine  -CP      Rx Minutes  15 mins  -CP      Ice S/P Rx  Yes  -CP         ELECTRICAL STIMULATION    Attended/Unattended  Unattended  -CP      Stimulation Type  IFC  -CP      Location/Electrode Placement/Other  cspine  -CP       PT E-Stim Unattended (Manual) Minutes  15  -CP         Traction 11211    Traction Type  Cervical  -CP      Rx Minutes  15  -CP      Duration  Intermittent  -CP      Position  Hook-lying  -CP      Weight  -- 12/7  -CP        User Key  (r) = Recorded By, (t) = Taken By, (c) = Cosigned By    Initials Name Provider Type    Wanda Monroe PTA Physical Therapy Assistant        OP Exercises     Row Name 11/26/19 1200             Subjective Comments    Subjective Comments  Patient states that her hands, arms back of head, stomach and chest will go numb. This has been going on for years.  She does work for [employer].  -CP         Subjective Pain    Able to rate subjective pain?  yes  -CP      Pre-Treatment Pain Level  3  -CP      Post-Treatment Pain Level  1  -CP         Exercise 1    Exercise Name 1  see manual  -CP         Exercise 2    Exercise Name 2  doorways stretch  -CP      Cueing 2  Verbal  -CP      Sets 2  3  -CP      Time 2  30  -CP         Exercise 3    Exercise Name 3  see modalities  -CP        User Key  (r) = Recorded By, (t) = Taken By, (c) = Cosigned By    Initials Name Provider Type    RENAN Griffith  Wanda TOLEDO PTA Physical Therapy Assistant                      Manual Rx (last 36 hours)      Manual Treatments     Row Name 11/26/19 1300             Manual Rx 1    Manual Rx 1 Location  cspine  -CP      Manual Rx 1 Type  distraction and ROM  -CP      Manual Rx 1 Duration  5  -CP        User Key  (r) = Recorded By, (t) = Taken By, (c) = Cosigned By    Initials Name Provider Type    CP Wanda Griffith PTA Physical Therapy Assistant          PT OP Goals     Row Name 11/26/19 1300          PT Short Term Goals    STG Date to Achieve  11/27/19  -CP     STG 1  Pt indep with HEP for R shoulder/neck  -CP     STG 1 Progress  Ongoing  -CP     STG 2  Improve R shoulder ER MMT to 5/5  -CP     STG 2 Progress  Not Met  -CP     STG 3  Improve R shoulder ER/IR AROM to 80/70 degrees with no R scapula pain elicited at endrange  -CP     STG 3 Progress  Not Met  -CP     STG 4  Perform all job duties with 0/10 L scapula region pain  -CP     STG 4 Progress  Not Met  -CP        Time Calculation    PT Goal Re-Cert Due Date  11/27/19  -CP       User Key  (r) = Recorded By, (t) = Taken By, (c) = Cosigned By    Initials Name Provider Type    CP Wanda Griffith PTA Physical Therapy Assistant          Therapy Education  Education Details: doorway stretch  Given: HEP  Program: New  How Provided: Verbal, Demonstration, Written  Provided to: Patient  Level of Understanding: Teach back education performed, Verbalized, Demonstrated              Time Calculation:   Start Time: 1105  Stop Time: 1200  Time Calculation (min): 55 min  Total Timed Code Minutes- PT: 33 minute(s)  Therapy Charges for Today     Code Description Service Date Service Provider Modifiers Qty    92737703899 HC PT ELECTRICAL STIM UNATTENDED 11/26/2019 Wanda Griffith, PTA  1    72969020932 HC PT MANUAL THERAPY EA 15 MIN 11/26/2019 Wanda Griffith, PTA GP 1    54879860228 HC PT TRACTION CERVICAL 11/26/2019 Wanda Griffith, SIOBHAN GP 1                    Wanda Griffith  PTA  11/26/2019

## 2019-12-02 ENCOUNTER — APPOINTMENT (OUTPATIENT)
Dept: ULTRASOUND IMAGING | Facility: HOSPITAL | Age: 29
End: 2019-12-02

## 2019-12-02 ENCOUNTER — HOSPITAL ENCOUNTER (EMERGENCY)
Facility: HOSPITAL | Age: 29
Discharge: HOME | End: 2019-12-02
Attending: FAMILY MEDICINE | Admitting: FAMILY MEDICINE

## 2019-12-02 VITALS
HEART RATE: 82 BPM | OXYGEN SATURATION: 95 % | WEIGHT: 151.3 LBS | RESPIRATION RATE: 17 BRPM | SYSTOLIC BLOOD PRESSURE: 127 MMHG | HEIGHT: 59 IN | TEMPERATURE: 98.2 F | BODY MASS INDEX: 30.5 KG/M2 | DIASTOLIC BLOOD PRESSURE: 83 MMHG

## 2019-12-02 DIAGNOSIS — R10.2 PELVIC PAIN: Primary | ICD-10-CM

## 2019-12-02 DIAGNOSIS — N83.201 RIGHT OVARIAN CYST: ICD-10-CM

## 2019-12-02 LAB
ALBUMIN SERPL-MCNC: 4.3 G/DL (ref 3.5–5.2)
ALBUMIN/GLOB SERPL: 1.5 G/DL
ALP SERPL-CCNC: 46 U/L (ref 39–117)
ALT SERPL W P-5'-P-CCNC: 45 U/L (ref 1–33)
ANION GAP SERPL CALCULATED.3IONS-SCNC: 12 MMOL/L (ref 5–15)
AST SERPL-CCNC: 33 U/L (ref 1–32)
BASOPHILS # BLD AUTO: 0.05 10*3/MM3 (ref 0–0.2)
BASOPHILS NFR BLD AUTO: 0.8 % (ref 0–1.5)
BILIRUB SERPL-MCNC: 0.4 MG/DL (ref 0.2–1.2)
BILIRUB UR QL STRIP: NEGATIVE
BUN BLD-MCNC: 8 MG/DL (ref 6–20)
BUN/CREAT SERPL: 13.6 (ref 7–25)
CALCIUM SPEC-SCNC: 9.1 MG/DL (ref 8.6–10.5)
CHLORIDE SERPL-SCNC: 101 MMOL/L (ref 98–107)
CLARITY UR: CLEAR
CO2 SERPL-SCNC: 25 MMOL/L (ref 22–29)
COLOR UR: YELLOW
CREAT BLD-MCNC: 0.59 MG/DL (ref 0.57–1)
DEPRECATED RDW RBC AUTO: 39.7 FL (ref 37–54)
EOSINOPHIL # BLD AUTO: 0.25 10*3/MM3 (ref 0–0.4)
EOSINOPHIL NFR BLD AUTO: 3.9 % (ref 0.3–6.2)
ERYTHROCYTE [DISTWIDTH] IN BLOOD BY AUTOMATED COUNT: 12.1 % (ref 12.3–15.4)
GFR SERPL CREATININE-BSD FRML MDRD: 121 ML/MIN/1.73
GLOBULIN UR ELPH-MCNC: 2.8 GM/DL
GLUCOSE BLD-MCNC: 78 MG/DL (ref 65–99)
GLUCOSE UR STRIP-MCNC: NEGATIVE MG/DL
HCG SERPL QL: NEGATIVE
HCT VFR BLD AUTO: 38.6 % (ref 34–46.6)
HGB BLD-MCNC: 12.8 G/DL (ref 12–15.9)
HGB UR QL STRIP.AUTO: NEGATIVE
HOLD SPECIMEN: NORMAL
HOLD SPECIMEN: NORMAL
IMM GRANULOCYTES # BLD AUTO: 0.02 10*3/MM3 (ref 0–0.05)
IMM GRANULOCYTES NFR BLD AUTO: 0.3 % (ref 0–0.5)
KETONES UR QL STRIP: ABNORMAL
LEUKOCYTE ESTERASE UR QL STRIP.AUTO: NEGATIVE
LIPASE SERPL-CCNC: 21 U/L (ref 13–60)
LYMPHOCYTES # BLD AUTO: 1.89 10*3/MM3 (ref 0.7–3.1)
LYMPHOCYTES NFR BLD AUTO: 29.8 % (ref 19.6–45.3)
MCH RBC QN AUTO: 29.7 PG (ref 26.6–33)
MCHC RBC AUTO-ENTMCNC: 33.2 G/DL (ref 31.5–35.7)
MCV RBC AUTO: 89.6 FL (ref 79–97)
MONOCYTES # BLD AUTO: 0.56 10*3/MM3 (ref 0.1–0.9)
MONOCYTES NFR BLD AUTO: 8.8 % (ref 5–12)
NEUTROPHILS # BLD AUTO: 3.57 10*3/MM3 (ref 1.7–7)
NEUTROPHILS NFR BLD AUTO: 56.4 % (ref 42.7–76)
NITRITE UR QL STRIP: NEGATIVE
NRBC BLD AUTO-RTO: 0 /100 WBC (ref 0–0.2)
PH UR STRIP.AUTO: 7.5 [PH] (ref 5–9)
PLATELET # BLD AUTO: 309 10*3/MM3 (ref 140–450)
PMV BLD AUTO: 10 FL (ref 6–12)
POTASSIUM BLD-SCNC: 3.8 MMOL/L (ref 3.5–5.2)
PROT SERPL-MCNC: 7.1 G/DL (ref 6–8.5)
PROT UR QL STRIP: NEGATIVE
RBC # BLD AUTO: 4.31 10*6/MM3 (ref 3.77–5.28)
SODIUM BLD-SCNC: 138 MMOL/L (ref 136–145)
SP GR UR STRIP: 1.01 (ref 1–1.03)
UROBILINOGEN UR QL STRIP: ABNORMAL
WBC NRBC COR # BLD: 6.34 10*3/MM3 (ref 3.4–10.8)
WHOLE BLOOD HOLD SPECIMEN: NORMAL
WHOLE BLOOD HOLD SPECIMEN: NORMAL

## 2019-12-02 PROCEDURE — 87591 N.GONORRHOEAE DNA AMP PROB: CPT | Performed by: NURSE PRACTITIONER

## 2019-12-02 PROCEDURE — 85025 COMPLETE CBC W/AUTO DIFF WBC: CPT

## 2019-12-02 PROCEDURE — 96372 THER/PROPH/DIAG INJ SC/IM: CPT

## 2019-12-02 PROCEDURE — 25010000002 KETOROLAC TROMETHAMINE PER 15 MG: Performed by: NURSE PRACTITIONER

## 2019-12-02 PROCEDURE — 96374 THER/PROPH/DIAG INJ IV PUSH: CPT

## 2019-12-02 PROCEDURE — 80053 COMPREHEN METABOLIC PANEL: CPT

## 2019-12-02 PROCEDURE — 87491 CHLMYD TRACH DNA AMP PROBE: CPT | Performed by: NURSE PRACTITIONER

## 2019-12-02 PROCEDURE — 99284 EMERGENCY DEPT VISIT MOD MDM: CPT

## 2019-12-02 PROCEDURE — 81003 URINALYSIS AUTO W/O SCOPE: CPT

## 2019-12-02 PROCEDURE — 84703 CHORIONIC GONADOTROPIN ASSAY: CPT | Performed by: NURSE PRACTITIONER

## 2019-12-02 PROCEDURE — 25010000002 DICYCLOMINE PER 20 MG: Performed by: NURSE PRACTITIONER

## 2019-12-02 PROCEDURE — 83690 ASSAY OF LIPASE: CPT

## 2019-12-02 PROCEDURE — 96375 TX/PRO/DX INJ NEW DRUG ADDON: CPT

## 2019-12-02 PROCEDURE — 87661 TRICHOMONAS VAGINALIS AMPLIF: CPT | Performed by: NURSE PRACTITIONER

## 2019-12-02 PROCEDURE — 76830 TRANSVAGINAL US NON-OB: CPT

## 2019-12-02 RX ORDER — KETOROLAC TROMETHAMINE 30 MG/ML
30 INJECTION, SOLUTION INTRAMUSCULAR; INTRAVENOUS ONCE
Status: COMPLETED | OUTPATIENT
Start: 2019-12-02 | End: 2019-12-02

## 2019-12-02 RX ORDER — SODIUM CHLORIDE 0.9 % (FLUSH) 0.9 %
10 SYRINGE (ML) INJECTION AS NEEDED
Status: DISCONTINUED | OUTPATIENT
Start: 2019-12-02 | End: 2019-12-03 | Stop reason: HOSPADM

## 2019-12-02 RX ORDER — FAMOTIDINE 10 MG/ML
20 INJECTION, SOLUTION INTRAVENOUS ONCE
Status: COMPLETED | OUTPATIENT
Start: 2019-12-02 | End: 2019-12-02

## 2019-12-02 RX ORDER — ONDANSETRON 4 MG/1
4 TABLET, ORALLY DISINTEGRATING ORAL EVERY 6 HOURS PRN
Qty: 10 TABLET | Refills: 0 | Status: SHIPPED | OUTPATIENT
Start: 2019-12-02 | End: 2020-04-05 | Stop reason: HOSPADM

## 2019-12-02 RX ORDER — KETOROLAC TROMETHAMINE 10 MG/1
10 TABLET, FILM COATED ORAL EVERY 6 HOURS PRN
Qty: 15 TABLET | Refills: 0 | Status: SHIPPED | OUTPATIENT
Start: 2019-12-02 | End: 2020-04-05 | Stop reason: HOSPADM

## 2019-12-02 RX ORDER — DICYCLOMINE HYDROCHLORIDE 10 MG/ML
20 INJECTION INTRAMUSCULAR ONCE
Status: COMPLETED | OUTPATIENT
Start: 2019-12-02 | End: 2019-12-02

## 2019-12-02 RX ORDER — PROMETHAZINE HYDROCHLORIDE 25 MG/1
25 SUPPOSITORY RECTAL EVERY 6 HOURS PRN
Qty: 15 SUPPOSITORY | Refills: 0 | Status: SHIPPED | OUTPATIENT
Start: 2019-12-02 | End: 2020-04-05 | Stop reason: HOSPADM

## 2019-12-02 RX ADMIN — KETOROLAC TROMETHAMINE 30 MG: 30 INJECTION, SOLUTION INTRAMUSCULAR at 20:45

## 2019-12-02 RX ADMIN — DICYCLOMINE HYDROCHLORIDE 20 MG: 20 INJECTION INTRAMUSCULAR at 20:45

## 2019-12-02 RX ADMIN — FAMOTIDINE 20 MG: 10 INJECTION INTRAVENOUS at 20:45

## 2019-12-02 RX ADMIN — SODIUM CHLORIDE 1000 ML: 9 INJECTION, SOLUTION INTRAVENOUS at 20:45

## 2019-12-03 ENCOUNTER — APPOINTMENT (OUTPATIENT)
Dept: PHYSICAL THERAPY | Facility: HOSPITAL | Age: 29
End: 2019-12-03

## 2019-12-03 LAB
C TRACH RRNA CVX QL NAA+PROBE: NEGATIVE
N GONORRHOEA RRNA SPEC QL NAA+PROBE: NEGATIVE
TRICHOMONAS VAGINALIS PCR: NEGATIVE

## 2019-12-03 NOTE — ED NOTES
Pt presents to the ED with c/o abdominal pain and pelvic pain onset 2 weeks Pt reports she also has nausea and dizziness.      Lois Pillai RN  12/02/19 4311

## 2019-12-03 NOTE — ED PROVIDER NOTES
Subjective   Patient presents to the ER complaining of lower abdominal pelvic pain for the past 2 weeks.  She states she has the Mirena in since  and does not have routine menstrual cycles.  She states she has been having some nausea without vomiting and slight dizziness.  States she is also been having contraction-like feelings and cramping to her abdomen that come and go throughout the day.  She also complains of a thick vaginal discharge without odor or color.  Denies any vaginal itching.  She states she does have a history of IBS and was given a prescription for omeprazole which she has not filled yet.            Review of Systems   Constitutional: Positive for appetite change (decreased).   Respiratory: Negative for shortness of breath.    Cardiovascular: Negative for chest pain.   Gastrointestinal: Positive for abdominal pain and nausea. Negative for abdominal distention, blood in stool, constipation, diarrhea and vomiting.   Genitourinary: Positive for pelvic pain and vaginal discharge (thick clear). Negative for difficulty urinating, dysuria, flank pain, frequency and vaginal bleeding.   Neurological: Negative for dizziness.       Past Medical History:   Diagnosis Date   • Adjustment disorder with depressed mood     Adjustment disorder with depressed mood - on celexa and 1 week risperdal   • Costal chondritis    • Cough     Cough - improved   • Hand eczema    • Influenza    • Not vaccinated against influenza 2016    INFLUENZA IMMUN NOT ADMIN, REASON NOT DOC  (1) - CHERI GRANADOS ([facility])    • Rib pain    • Stress     Difficulty managing stress   • Upper respiratory infection        No Known Allergies    Past Surgical History:   Procedure Laterality Date   •  SECTION       Section (2)   • CHOLECYSTECTOMY      Cholecystectomy (1)   • CHOLECYSTECTOMY      Cholecystectomy, laparoscopic (1)   • COLONOSCOPY     • COLONOSCOPY N/A 2019    Procedure: COLONOSCOPY;  Surgeon: Dariel  Obed MALDONADO MD;  Location: [facility] ENDOSCOPY;  Service: Gastroenterology   • CYSTOSCOPY      Cystoscopy (1)   • CYSTOSCOPY      Cystoscopy & treatment (1)   • DENTAL PROCEDURE      Dental surgery procedure (1) - wisdom teeth   • ENDOSCOPY N/A 8/12/2019    Procedure: ESOPHAGOGASTRODUODENOSCOPY;  Surgeon: Obed Vieira MD;  Location: [facility] ENDOSCOPY;  Service: Gastroenterology       Family History   Problem Relation Age of Onset   • ADD / ADHD Mother    • Diabetes Mother    • Anxiety disorder Mother    • ADD / ADHD Brother    • Heart murmur Brother    • Diabetes Maternal Grandmother    • Depression Other         Depressive disorder   • Heart disease Other    • Retinitis pigmentosa Other    • Arrhythmia Other    • Breast cancer Neg Hx    • Colon cancer Neg Hx         Colorectal cancer   • Endometrial cancer Neg Hx    • Ovarian cancer Neg Hx        Social History     Socioeconomic History   • Marital status: Legally      Spouse name: Not on file   • Number of children: Not on file   • Years of education: Not on file   • Highest education level: Not on file   Tobacco Use   • Smoking status: Never Smoker   • Smokeless tobacco: Never Used   Substance and Sexual Activity   • Alcohol use: Yes     Comment: rare   • Drug use: No   • Sexual activity: Yes     Partners: Male     Birth control/protection: Implant     Comment:    Social History Narrative    ** Merged History Encounter **                Objective   Physical Exam   Constitutional: She is oriented to person, place, and time. She appears well-developed and well-nourished. She does not appear ill.   Cardiovascular: Normal rate, regular rhythm and normal heart sounds.   No murmur heard.  Pulmonary/Chest: Effort normal and breath sounds normal. She has no wheezes.   Abdominal: Normal appearance and bowel sounds are normal. She exhibits no distension. There is tenderness in the suprapubic area. There is no rebound, no guarding, no CVA tenderness, no  tenderness at McBurney's point and negative Rod's sign. Hernia confirmed negative in the right inguinal area and confirmed negative in the left inguinal area.   Genitourinary: Rectum normal. Pelvic exam was performed with patient supine. There is no rash, tenderness or lesion on the right labia. There is no rash, tenderness or lesion on the left labia. Cervix exhibits no motion tenderness and no discharge. Right adnexum displays tenderness. Right adnexum displays no mass. Left adnexum displays tenderness. Left adnexum displays no mass. No erythema in the vagina. Vaginal discharge (thick white and light brown) found.   Lymphadenopathy: No inguinal adenopathy noted on the right or left side.   Neurological: She is alert and oriented to person, place, and time.   Skin: Skin is warm and dry. Capillary refill takes less than 2 seconds.   Psychiatric: She has a normal mood and affect. Her behavior is normal.       Procedures           ED Course  ED Course as of Dec 02 2239   Mon Dec 02, 2019   2046 Labs reviewed with patient. Pelvic exam completed. Will order pelvic US at this time.  [SH]   2107 Patient discussed and reviewed with Dr. Renee. Care transferred at this time. Pending US to be completed.   [SH]      ED Course User Index  [SH] Carina George, APRN                  [facility]    Final diagnoses:   Pelvic pain   Right ovarian cyst              Lio Renee MD  12/02/19 2239

## 2019-12-06 ENCOUNTER — HOSPITAL ENCOUNTER (OUTPATIENT)
Dept: PHYSICAL THERAPY | Facility: HOSPITAL | Age: 29
Setting detail: THERAPIES SERIES
Discharge: HOME | End: 2019-12-06

## 2019-12-06 DIAGNOSIS — M25.511 RIGHT SHOULDER PAIN, UNSPECIFIED CHRONICITY: Primary | ICD-10-CM

## 2019-12-06 PROCEDURE — 97140 MANUAL THERAPY 1/> REGIONS: CPT

## 2019-12-06 PROCEDURE — 97530 THERAPEUTIC ACTIVITIES: CPT

## 2019-12-06 PROCEDURE — 97535 SELF CARE MNGMENT TRAINING: CPT

## 2019-12-06 NOTE — THERAPY PROGRESS REPORT/RE-CERT
Outpatient Physical Therapy Ortho Progress Note  [facility]     Patient Name: Irene Collins  : 1990  MRN: [id number]  Today's Date: 2019      Visit Date: 2019    Visit Dx:    ICD-10-CM ICD-9-CM   1. Right shoulder pain, unspecified chronicity M25.511 719.41       Patient Active Problem List   Diagnosis   • Blood in stool   • Diarrhea   • Generalized abdominal pain   • Fatty liver   • Irritable bowel syndrome   • Thrombocytosis (CMS/HCC)        Past Medical History:   Diagnosis Date   • Adjustment disorder with depressed mood     Adjustment disorder with depressed mood - on celexa and 1 week risperdal   • Costal chondritis    • Cough     Cough - improved   • Hand eczema    • Influenza    • Not vaccinated against influenza 2016    INFLUENZA IMMUN NOT ADMIN, REASON NOT DOC  (1) - CHERI GRANADOS ([facility])    • Rib pain    • Stress     Difficulty managing stress   • Upper respiratory infection         Past Surgical History:   Procedure Laterality Date   •  SECTION       Section (2)   • CHOLECYSTECTOMY      Cholecystectomy (1)   • CHOLECYSTECTOMY      Cholecystectomy, laparoscopic (1)   • COLONOSCOPY     • COLONOSCOPY N/A 2019    Procedure: COLONOSCOPY;  Surgeon: Obed Vieira MD;  Location: [facility] ENDOSCOPY;  Service: Gastroenterology   • CYSTOSCOPY      Cystoscopy (1)   • CYSTOSCOPY      Cystoscopy & treatment (1)   • DENTAL PROCEDURE      Dental surgery procedure (1) - wisdom teeth   • ENDOSCOPY N/A 2019    Procedure: ESOPHAGOGASTRODUODENOSCOPY;  Surgeon: Obed Vieira MD;  Location: [facility] ENDOSCOPY;  Service: Gastroenterology       PT Ortho     Row Name 19 1000       Precautions and Contraindications    Precautions/Limitations  no known precautions/limitations  -       Subjective Pain    Subjective Pain Comment  Pt reports her sensation ability into her arm is intermittently diminished, but did not present c limitations today.  -        Posture/Observations    Posture/Observations Comments  Mild guarding noted R shld girdle/UT/neck  -       Neural Tension Signs- Upper Quarter Clearing    ULNTT 1  Negative  -    ULNTT 2  Negative  -AH    ULNTT 3  Negative  -    ULNTT 4  Negative  -       Sensory Screen for Light Touch- Upper Quarter Clearing    C4 (posterior shoulder)  Intact  -    C5 (lateral upper arm)  Intact  -AH    C6 (tip of thumb)  Intact  -AH    C7 (tip of 3rd finger)  Intact  -AH    C8 (tip of 5th finger)  Intact  -AH    T1 (medial lower arm)  Intact  -       General ROM    GENERAL ROM COMMENTS  AAROM R shld all planes WFL, mild pain/tightness noted end ranges (pre session), no Sx pain reported post session through ROM.  -       MMT (Manual Muscle Testing)    General MMT Comments  MMT: R shoulder-flex 5/5 abd 5/5 ER 4+/5 IR 5/5 R elbow flex/ext 5/5 L shoulder-5/5  -       Sensation    Sensation WNL?  WFL  -    Additional Comments  N&T intermittently limited and activity based - min increase in limitation through UBE activity, but reduced upon stopping.  -      User Key  (r) = Recorded By, (t) = Taken By, (c) = Cosigned By    Initials Name Provider Type     Ace Hernandez, PT Physical Therapist                      PT Assessment/Plan     Row Name 12/06/19 1200          PT Assessment    Functional Limitations  Performance in work activities;Performance in sport activities;Performance in self-care ADL;Performance in leisure activities;Limitation in home management;Decreased safety during functional activities;Limitations in functional capacity and performance  -     Impairments  Impaired flexibility;Pain;Range of motion;Sensation;Muscle strength;Poor body mechanics;Posture  -     Assessment Comments  Pt returns today for reassessment.  Pt demo's some improvement in QDASH score and shld function, however not significant score increase.  Pt responded very well to activities & interventions this session, noting an  improved postural comfort post session and no lingering or significant increases in nn Sx throughout or post session.  Pt's HEP was modified/updated to further improve upon the strength & proprioceptive ability of her shld girdle through dynamic movements, and tolerated well, despite some mild nn Sx into hands post the wall plank activities (modified for table top to reduce wrist ext at posture at home).  Overall, pt is responding to PT interventions and will benefit from continued skilled PT.  Pt's intermittent nn Sx appear to be possibly due to lack of stabilization/strength of tissues causing a lack of proper arthrokinematics and tissue length:tension relationships & rhythmns.  -     Please refer to paper survey for additional self-reported information  Yes  -     Rehab Potential  Good  -     Patient/caregiver participated in establishment of treatment plan and goals  Yes  -     Patient would benefit from skilled therapy intervention  Yes  -        PT Plan    PT Frequency  2x/week  -     Predicted Duration of Therapy Intervention (Therapy Eval)  3 wks  -     Planned CPT's?  PT RE-EVAL: 11345;PT THER PROC EA 15 MIN: 83638;PT MANUAL THERAPY EA 15 MIN: 39295;PT ELECTRICAL STIM UNATTEND: ;PT TRACTION CERVICAL: 82236;PT HOT/COLD PACK WC NONMCARE: 66569;PT THER SUPP EA 15 MIN;PT THER ACT EA 15 MIN: 34220;PT NEUROMUSC RE-EDUCATION EA 15 MIN: 67102;PT SELF CARE/HOME MGMT/TRAIN EA 15: 95963  -     PT Plan Comments  Continue POC, focus on manual interventions for soft tissue pliabilities & activities for dynamic strength/postural control as tolerable.  -       User Key  (r) = Recorded By, (t) = Taken By, (c) = Cosigned By    Initials Name Provider Type     Ace Hernandez, PT Physical Therapist            OP Exercises     Row Name 12/06/19 1200 12/06/19 1000          Subjective Comments    Subjective Comments  --  Pt states that she has appt c neurologist on 12/20/19 for R UE assessment.  -      "   Subjective Pain    Able to rate subjective pain?  --  yes  -     Pre-Treatment Pain Level  --  3  -     Subjective Pain Comment  --  Pt reports her sensation ability into her arm is intermittently diminished, but did not present c limitations today.  -        Total Minutes    25655 - PT Therapeutic Activity Minutes  10  -AH  --     68515 - PT Manual Therapy Minutes  20  -AH  --        Exercise 1    Exercise Name 1  --  Pro II UEs  -     Cueing 1  --  Verbal  -     Time 1  --  10'  -     Additional Comments  --  L3; stated started getting  some N&T into R UE, but reduced post completion.  -        Exercise 2    Exercise Name 2  --  Re-eval tests & measures  -     Additional Comments  --  see ortho  -        Exercise 3    Exercise Name 3  --  Wall plank triplanar pelvic drivers  -     Cueing 3  --  Verbal;Tactile;Demo  -     Sets 3  --  1  -     Reps 3  --  10 ea  -     Additional Comments  --  HEP progression/modification  -        Exercise 4    Exercise Name 4  --  B Artisan Stretch c active tension modification  -     Cueing 4  --  Verbal;Demo  -     Sets 4  --  1  -     Reps 4  --  5   -     Time 4  --  2\" ea position  -     Additional Comments  --  HEP progression/modification  -       User Key  (r) = Recorded By, (t) = Taken By, (c) = Cosigned By    Initials Name Provider Type     Ace Hernandez, PT Physical Therapist                      Manual Rx (last 36 hours)      Manual Treatments     Row Name 12/06/19 1200 12/06/19 1100          Total Minutes    87712 - PT Manual Therapy Minutes  20  -AH  --        Manual Rx 1    Manual Rx 1 Location  --  R shld girdle  -     Manual Rx 1 Type  --  connective tissue mobilizations c superficial-deep tissue progressions c P/AROM drivers R UE all planes & deep/regular breathing for movement facilitations.  Noted aberrant tissue tensions R UT/SS/LS, R choracoid structures, R scalenes - all reduced post intervention & increased " relaxation and comfort reported post session.  -     Manual Rx 1 Grade  --  2-3  -     Manual Rx 1 Duration  --  20'  -       User Key  (r) = Recorded By, (t) = Taken By, (c) = Cosigned By    Initials Name Provider Type    Ace Gibson, PT Physical Therapist          PT OP Goals     Row Name 12/06/19 1200          PT Short Term Goals    STG Date to Achieve  11/27/19  -     STG 1  Pt indep with HEP for R shoulder/neck  -     STG 1 Progress  Ongoing  -     STG 2  Improve R shoulder ER MMT to 5/5  -     STG 2 Progress  Not Met  -     STG 3  Improve R shoulder ER/IR AROM to 80/70 degrees with no R scapula pain elicited at endrange  -     STG 3 Progress  Not Met;Progressing  -     STG 3 Progress Comments  P/AAROM Met  -     STG 4  Perform all job duties with 0/10 L scapula region pain  -[facility] 4 Progress  Not Met  -       User Key  (r) = Recorded By, (t) = Taken By, (c) = Cosigned By    Initials Name Provider Type    Ace Gibson, PT Physical Therapist          Therapy Education  Education Details: HEP progressed; Pt educated on symptomology of arthrokinematics & tissue/joint rhythmns that are required for normal and painfree movement efficiency.  Given: HEP, Symptoms/condition management, Pain management, Posture/body mechanics  Program: Progressed, New  How Provided: Verbal, Demonstration  Provided to: Patient  Level of Understanding: Teach back education performed, Verbalized, Demonstrated  90142 - PT Self Care/Mgmt Minutes: 15    Outcome Measure Options: Quick DASH  Quick DASH  Open a tight or new jar.: Severe Difficulty  Do heavy household chores (e.g., wash walls, wash floors): Moderate Difficulty  Carry a shopping bag or briefcase: Mild Difficulty  Wash your back: Mild Difficulty  Use a knife to cut food: Moderate Difficulty  Recreational activities in which you take some force or impact through your arm, should or hand (e.g. golf, hammering, tennis, etc.): Moderate  Difficulty  During the past week, to what extent has your arm, shoulder, or hand problem interfered with your normal social activites with family, friends, neighbors or groups?: Slightly  During the past week, were you limited in your work or other regular daily activities as a result of your arm, shoulder or hand problem?: Moderately Limited  Arm, Shoulder, or hand pain: Mild  Tingling (pins and needles) in your arm, shoulder, or hand: Mild  During the past week, how much difficulty have you had sleeping because of the pain in your arm, shoulder or hand?: Mild Difficulty  Number of Questions Answered: 11  Quick DASH Score: 38.64         Time Calculation:   Start Time: 1015  Stop Time: 1100  Time Calculation (min): 45 min  Total Timed Code Minutes- PT: 45 minute(s)  Therapy Charges for Today     Code Description Service Date Service Provider Modifiers Qty    40460058910  PT THERAPEUTIC ACT EA 15 MIN 12/6/2019 Ace Hernandez, PT GP 1    [id number]  PT MANUAL THERAPY EA 15 MIN 12/6/2019 Ace Hernandez, PT GP 1    [id number]  PT SELF CARE/MGMT/TRAIN EA 15 MIN 12/6/2019 Ace Hernandez, PT GP 1          PT G-Codes  Outcome Measure Options: Quick DASH  Quick DASH Score: 38.64         Ace Hernandez PT  12/6/2019

## 2019-12-13 ENCOUNTER — HOSPITAL ENCOUNTER (OUTPATIENT)
Dept: PHYSICAL THERAPY | Facility: HOSPITAL | Age: 29
Setting detail: THERAPIES SERIES
Discharge: HOME | End: 2019-12-13

## 2019-12-13 DIAGNOSIS — M25.511 RIGHT SHOULDER PAIN, UNSPECIFIED CHRONICITY: Primary | ICD-10-CM

## 2019-12-13 PROCEDURE — 97110 THERAPEUTIC EXERCISES: CPT | Performed by: PHYSICAL THERAPIST

## 2019-12-13 NOTE — THERAPY TREATMENT NOTE
Outpatient Physical Therapy Ortho Treatment Note  [facility]     Patient Name: Irene Collins  : 1990  MRN: [id number]  Today's Date: 2019      Visit Date: 2019  Attendance: 4/   Subjective Improvement: ??  Next MD Appt: 19 -- Dr. Abrams  Recert Date: 19    Therapy Diagnosis:  Cervical and right shoulder pain    Visit Dx:    ICD-10-CM ICD-9-CM   1. Right shoulder pain, unspecified chronicity M25.511 719.41            Past Medical History:   Diagnosis Date   • Adjustment disorder with depressed mood     Adjustment disorder with depressed mood - on celexa and 1 week risperdal   • Costal chondritis    • Cough     Cough - improved   • Hand eczema    • Influenza    • Not vaccinated against influenza 2016    INFLUENZA IMMUN NOT ADMIN, REASON NOT DOC  (1) - CHERI LOCKROBERTA ([facility])    • Rib pain    • Stress     Difficulty managing stress   • Upper respiratory infection         Past Surgical History:   Procedure Laterality Date   •  SECTION       Section (2)   • CHOLECYSTECTOMY      Cholecystectomy (1)   • CHOLECYSTECTOMY      Cholecystectomy, laparoscopic (1)   • COLONOSCOPY     • COLONOSCOPY N/A 2019    Procedure: COLONOSCOPY;  Surgeon: Obed Vieira MD;  Location: [facility] ENDOSCOPY;  Service: Gastroenterology   • CYSTOSCOPY      Cystoscopy (1)   • CYSTOSCOPY      Cystoscopy & treatment (1)   • DENTAL PROCEDURE      Dental surgery procedure (1) - wisdom teeth   • ENDOSCOPY N/A 2019    Procedure: ESOPHAGOGASTRODUODENOSCOPY;  Surgeon: Obed Vieira MD;  Location: [facility] ENDOSCOPY;  Service: Gastroenterology       PT Ortho     Row Name 19 1300       Subjective Comments    Subjective Comments  Tingly, pins and needles sensation in B UE. Also having pain in R scapula.  -SS       Precautions and Contraindications    Precautions/Limitations  no known precautions/limitations  -SS       Subjective Pain    Able to rate subjective pain?  yes   -    Pre-Treatment Pain Level  -- 3-4/10  -       Posture/Observations    Posture/Observations Comments  Forward head, rounded shoulder posture  -SS       Cervical/Thoracic Special Tests    Adson's Maneuver (TOS)  Right:;Positive;Left:;Negative diminished pulse but no numbness  -SS    Bry Test (TOS)  Bilateral:;Positive  -SS    Miguelina's Test (TOS)  Bilateral:;Negative  -SS    Cervical/Thoracic Special Tests Comments  TTP with possible trigger point R UT and teres minor.  -      User Key  (r) = Recorded By, (t) = Taken By, (c) = Cosigned By    Initials Name Provider Type     Wes iSlver, PT DPT Physical Therapist            PT Assessment/Plan     Row Name 12/13/19 1300          PT Assessment    Functional Limitations  Performance in work activities;Performance in sport activities;Performance in self-care ADL;Performance in leisure activities;Limitation in home management;Decreased safety during functional activities;Limitations in functional capacity and performance  -     Impairments  Impaired flexibility;Pain;Range of motion;Sensation;Muscle strength;Poor body mechanics;Posture  -     Assessment Comments  Decreased symptoms post-treatment. Possible TOS.   -     Rehab Potential  Good  -     Patient/caregiver participated in establishment of treatment plan and goals  Yes  -SS     Patient would benefit from skilled therapy intervention  Yes  -SS        PT Plan    PT Frequency  2x/week  -     Predicted Duration of Therapy Intervention (Therapy Eval)  3 weeks  -     PT Plan Comments  Continue POC. Postural control and neurodynamics.  -       User Key  (r) = Recorded By, (t) = Taken By, (c) = Cosigned By    Initials Name Provider Type     Wes Silver, PT DPT Physical Therapist            OP Exercises     Row Name 12/13/19 1300             Subjective Comments    Subjective Comments  Tingly, pins and needles sensation in B UE. Also having pain in R scapula.  -          Subjective Pain    Able to rate subjective pain?  yes  -SS      Pre-Treatment Pain Level  -- 3-4/10  -SS      Post-Treatment Pain Level  -- 1-2/10  -SS         Exercise 1    Exercise Name 1  Doorway pec stretch - hands low  -SS      Cueing 1  Verbal;Demo  -SS      Sets 1  3  -SS      Time 1  30 sec hold  -SS         Exercise 2    Exercise Name 2  Towel pec stretch  -SS      Cueing 2  Verbal  -SS      Time 2  2 mins  -SS         Exercise 3    Exercise Name 3  Supine R UE median neural tension  -SS      Cueing 3  Tactile  -SS      Sets 3  1  -SS      Reps 3  15  -SS         Exercise 4    Exercise Name 4  Supine R UE median nerve gliding  -SS      Cueing 4  Tactile  -SS      Reps 4  15  -SS         Exercise 5    Exercise Name 5  MFR R UT  -SS      Cueing 5  Tactile  -SS      Time 5  3 mins  -SS         Exercise 6    Exercise Name 6  Scapular retraction - seated  -SS      Cueing 6  Verbal;Demo  -SS      Sets 6  1  -SS      Reps 6  15  -SS      Time 6  5 sec hold  -SS         Exercise 7    Exercise Name 7  Seated chin tuck  -SS      Cueing 7  Verbal;Demo  -SS      Sets 7  1  -SS      Reps 7  10  -SS      Time 7  5 sec hold  -SS        User Key  (r) = Recorded By, (t) = Taken By, (c) = Cosigned By    Initials Name Provider Type    Wes Del Valle, PT DPT Physical Therapist              PT OP Goals     Row Name 12/13/19 1300          PT Short Term Goals    STG Date to Achieve  11/27/19  -     STG 1  Pt indep with HEP for R shoulder/neck  -SS     STG 1 Progress  Ongoing  -SS     STG 2  Improve R shoulder ER MMT to 5/5  -SS     STG 2 Progress  Not Met  -SS     STG 3  Improve R shoulder ER/IR AROM to 80/70 degrees with no R scapula pain elicited at end range  -SS     STG 3 Progress  Not Met;Progressing  -SS     STG 4  Perform all job duties with 0/10 L scapula region pain  -     STG 4 Progress  Not Met  -        Time Calculation    PT Goal Re-Cert Due Date  12/27/19  -       User Key  (r) = Recorded By, (t)  = Taken By, (c) = Cosigned By    Initials Name Provider Type    SS Wes Silver, PT DPT Physical Therapist          Therapy Education  Education Details: seated chin tucks, scapular retraction  Given: HEP  Program: Progressed  How Provided: Verbal, Demonstration  Provided to: Patient  Level of Understanding: Verbalized, Demonstrated              Time Calculation:   Start Time: 1351  Stop Time: 1425  Time Calculation (min): 34 min  Therapy Charges for Today     Code Description Service Date Service Provider Modifiers Qty    43292505280 HC PT THER PROC EA 15 MIN 12/13/2019 Wes Silver, PT DPT GP 2                    Wes Silver, PT, DPT, CHT  12/13/2019

## 2019-12-19 ENCOUNTER — HOSPITAL ENCOUNTER (OUTPATIENT)
Dept: PHYSICAL THERAPY | Facility: HOSPITAL | Age: 29
Setting detail: THERAPIES SERIES
Discharge: HOME | End: 2019-12-19

## 2019-12-19 DIAGNOSIS — M25.511 RIGHT SHOULDER PAIN, UNSPECIFIED CHRONICITY: Primary | ICD-10-CM

## 2019-12-19 PROCEDURE — 97110 THERAPEUTIC EXERCISES: CPT | Performed by: PHYSICAL THERAPIST

## 2019-12-19 NOTE — THERAPY TREATMENT NOTE
Outpatient Physical Therapy Ortho Treatment Note  [facility]     Patient Name: Irene Collins  : 1990  MRN: [id number]  Today's Date: 2019      Visit Date: 2019  Attendance:   Subjective Improvement: ??  Next MD Appt: 19 -- Dr. Abrams  Recert Date: 19     Therapy Diagnosis:  Cervical and right shoulder pain  Visit Dx:    ICD-10-CM ICD-9-CM   1. Right shoulder pain, unspecified chronicity M25.511 719.41       Patient Active Problem List   Diagnosis   • Blood in stool   • Diarrhea   • Generalized abdominal pain   • Fatty liver   • Irritable bowel syndrome   • Thrombocytosis (CMS/HCC)        Past Medical History:   Diagnosis Date   • Adjustment disorder with depressed mood     Adjustment disorder with depressed mood - on celexa and 1 week risperdal   • Costal chondritis    • Cough     Cough - improved   • Hand eczema    • Influenza    • Not vaccinated against influenza 2016    INFLUENZA IMMUN NOT ADMIN, REASON NOT DOC  (1) - CHERI GRAANDOS ([facility])    • Rib pain    • Stress     Difficulty managing stress   • Upper respiratory infection         Past Surgical History:   Procedure Laterality Date   •  SECTION       Section (2)   • CHOLECYSTECTOMY      Cholecystectomy (1)   • CHOLECYSTECTOMY      Cholecystectomy, laparoscopic (1)   • COLONOSCOPY     • COLONOSCOPY N/A 2019    Procedure: COLONOSCOPY;  Surgeon: Obed Vieira MD;  Location: [facility] ENDOSCOPY;  Service: Gastroenterology   • CYSTOSCOPY      Cystoscopy (1)   • CYSTOSCOPY      Cystoscopy & treatment (1)   • DENTAL PROCEDURE      Dental surgery procedure (1) - wisdom teeth   • ENDOSCOPY N/A 2019    Procedure: ESOPHAGOGASTRODUODENOSCOPY;  Surgeon: Obed Vieira MD;  Location: [facility] ENDOSCOPY;  Service: Gastroenterology       PT Ortho     Row Name 19 1000       Precautions and Contraindications    Precautions/Limitations  no known precautions/limitations  -BS       General  "ROM    GENERAL ROM COMMENTS  AROM: L shoulder ER 63 deg IR 65 deg  -BS       MMT (Manual Muscle Testing)    General MMT Comments  R shoulder ER 5/5  -BS      User Key  (r) = Recorded By, (t) = Taken By, (c) = Cosigned By    Initials Name Provider Type    Rom Gruber, PT Physical Therapist                      PT Assessment/Plan     Row Name 12/19/19 1020          PT Assessment    Assessment Comments  Decreased R scapular/shoulder symptoms and improved R shoulder ER MMT post tx. May implicate spinal stenosis vs anterior cervical derangement. Increased L shoulder pain post tx.   -BS        PT Plan    PT Frequency  2x/week  -BS     Predicted Duration of Therapy Intervention (Therapy Eval)  3 weeks  -BS     PT Plan Comments  Recheck tolerance to what appears to be a directional preference into flexion.  -BS       User Key  (r) = Recorded By, (t) = Taken By, (c) = Cosigned By    Initials Name Provider Type    Rom Gruber, PT Physical Therapist            OP Exercises     Row Name 12/19/19 1000             Subjective Comments    Subjective Comments  pt reports reduced R scapular/shoulder pain with increase in L shoulder pain lately.   -BS         Subjective Pain    Able to rate subjective pain?  yes  -BS      Pre-Treatment Pain Level  3  -BS      Post-Treatment Pain Level  3  -BS         Exercise 1    Exercise Name 1  seated cervical retraction +/- self OP  -BS      Sets 1  1  -BS      Reps 1  10 ea  -BS         Exercise 2    Exercise Name 2  supine cervical retractions  -BS      Sets 2  1  -BS      Reps 2  10  -BS      Time 2  3\" hold  -BS         Exercise 3    Exercise Name 3  supine cervical extensions  -BS      Sets 3  1  -BS      Reps 3  10  -BS      Additional Comments  reduced neck/B shoulder pain  -BS         Exercise 4    Exercise Name 4  supine: manual cervical distraction  -BS      Time 4  30\"  -BS      Additional Comments  increased R scapula pain  -BS         Exercise 5    Exercise Name 5  supine " cervical flex w/ self OP  -BS      Sets 5  1  -BS      Reps 5  10  -BS         Exercise 6    Exercise Name 6  supine cervical flex w/ clinician OP  -BS      Sets 6  1  -BS      Reps 6  10  -BS         Exercise 7    Exercise Name 7  seated cervical flex w/ self OP  -BS      Sets 7  1  -BS      Reps 7  10  -BS        User Key  (r) = Recorded By, (t) = Taken By, (c) = Cosigned By    Initials Name Provider Type    BS Rom Basurto, PT Physical Therapist                       PT OP Goals     Row Name 12/19/19 1000          PT Short Term Goals    STG Date to Achieve  11/27/19  -BS     STG 1  Pt indep with HEP for R shoulder/neck  -BS     STG 1 Progress  Ongoing  -BS     STG 2  Improve R shoulder ER MMT to 5/5  -BS     STG 2 Progress  Met  -BS     STG 3  Improve R shoulder ER/IR AROM to 80/70 degrees with no R scapula pain elicited at endrange  -BS     STG 3 Progress  Not Met;Progressing  -BS     STG 4  Perform all job duties with 0/10 L scapula region pain  -BS     STG 4 Progress  Not Met  -BS        Time Calculation    PT Goal Re-Cert Due Date  12/27/19  -BS       User Key  (r) = Recorded By, (t) = Taken By, (c) = Cosigned By    Initials Name Provider Type    BS Rom Basurto, PT Physical Therapist          Therapy Education  Education Details: see flow sheet  Given: HEP  Program: New  How Provided: Verbal, Demonstration  Provided to: Patient  Level of Understanding: Verbalized, Demonstrated              Time Calculation:   Start Time: 1020  Stop Time: 1105  Time Calculation (min): 45 min  Total Timed Code Minutes- PT: 45 minute(s)  Therapy Charges for Today     Code Description Service Date Service Provider Modifiers Qty    10044128369 HC PT THER PROC EA 15 MIN 12/19/2019 Rom Basurto, PT GP 3                    Rom Basurto, PT  12/19/2019

## 2020-01-09 ENCOUNTER — HOSPITAL ENCOUNTER (OUTPATIENT)
Dept: MRI IMAGING | Facility: HOSPITAL | Age: 30
Discharge: HOME | End: 2020-01-09
Admitting: PSYCHIATRY & NEUROLOGY

## 2020-01-09 DIAGNOSIS — I67.82 CHRONIC CEREBRAL ISCHEMIA: ICD-10-CM

## 2020-01-09 PROCEDURE — 70551 MRI BRAIN STEM W/O DYE: CPT

## 2020-02-19 ENCOUNTER — OFFICE VISIT (OUTPATIENT)
Dept: GASTROENTEROLOGY | Facility: CLINIC | Age: 30
End: 2020-02-19

## 2020-02-19 ENCOUNTER — APPOINTMENT (OUTPATIENT)
Dept: LAB | Facility: HOSPITAL | Age: 30
End: 2020-02-19

## 2020-02-19 VITALS
WEIGHT: 162.8 LBS | HEART RATE: 84 BPM | DIASTOLIC BLOOD PRESSURE: 82 MMHG | HEIGHT: 59 IN | BODY MASS INDEX: 32.82 KG/M2 | SYSTOLIC BLOOD PRESSURE: 118 MMHG

## 2020-02-19 DIAGNOSIS — R10.10 UPPER ABDOMINAL PAIN: ICD-10-CM

## 2020-02-19 DIAGNOSIS — K21.00 GASTROESOPHAGEAL REFLUX DISEASE WITH ESOPHAGITIS: ICD-10-CM

## 2020-02-19 DIAGNOSIS — K76.0 FATTY LIVER: Primary | ICD-10-CM

## 2020-02-19 LAB
ALBUMIN SERPL-MCNC: 4.4 G/DL (ref 3.5–5.2)
ALBUMIN/GLOB SERPL: 1.6 G/DL
ALP SERPL-CCNC: 66 U/L (ref 39–117)
ALT SERPL W P-5'-P-CCNC: 45 U/L (ref 1–33)
ANION GAP SERPL CALCULATED.3IONS-SCNC: 14.7 MMOL/L (ref 5–15)
AST SERPL-CCNC: 39 U/L (ref 1–32)
BILIRUB SERPL-MCNC: 0.3 MG/DL (ref 0.2–1.2)
BUN BLD-MCNC: 14 MG/DL (ref 6–20)
BUN/CREAT SERPL: 25.9 (ref 7–25)
CALCIUM SPEC-SCNC: 9.1 MG/DL (ref 8.6–10.5)
CHLORIDE SERPL-SCNC: 99 MMOL/L (ref 98–107)
CO2 SERPL-SCNC: 27.3 MMOL/L (ref 22–29)
CREAT BLD-MCNC: 0.54 MG/DL (ref 0.57–1)
DEPRECATED RDW RBC AUTO: 43.5 FL (ref 37–54)
ERYTHROCYTE [DISTWIDTH] IN BLOOD BY AUTOMATED COUNT: 13 % (ref 12.3–15.4)
GFR SERPL CREATININE-BSD FRML MDRD: 133 ML/MIN/1.73
GLOBULIN UR ELPH-MCNC: 2.8 GM/DL
GLUCOSE BLD-MCNC: 72 MG/DL (ref 65–99)
HCT VFR BLD AUTO: 41.4 % (ref 34–46.6)
HGB BLD-MCNC: 13.4 G/DL (ref 12–15.9)
INR PPP: 0.98 (ref 0.8–1.2)
MCH RBC QN AUTO: 29.8 PG (ref 26.6–33)
MCHC RBC AUTO-ENTMCNC: 32.4 G/DL (ref 31.5–35.7)
MCV RBC AUTO: 92 FL (ref 79–97)
PLATELET # BLD AUTO: 496 10*3/MM3 (ref 140–450)
PMV BLD AUTO: 9.5 FL (ref 6–12)
POTASSIUM BLD-SCNC: 4.3 MMOL/L (ref 3.5–5.2)
PROT SERPL-MCNC: 7.2 G/DL (ref 6–8.5)
PROTHROMBIN TIME: 12.8 SECONDS (ref 11.1–15.3)
RBC # BLD AUTO: 4.5 10*6/MM3 (ref 3.77–5.28)
SODIUM BLD-SCNC: 141 MMOL/L (ref 136–145)
WBC NRBC COR # BLD: 6.83 10*3/MM3 (ref 3.4–10.8)

## 2020-02-19 PROCEDURE — 82465 ASSAY BLD/SERUM CHOLESTEROL: CPT | Performed by: NURSE PRACTITIONER

## 2020-02-19 PROCEDURE — 80053 COMPREHEN METABOLIC PANEL: CPT | Performed by: NURSE PRACTITIONER

## 2020-02-19 PROCEDURE — 85610 PROTHROMBIN TIME: CPT | Performed by: NURSE PRACTITIONER

## 2020-02-19 PROCEDURE — 82172 ASSAY OF APOLIPOPROTEIN: CPT | Performed by: NURSE PRACTITIONER

## 2020-02-19 PROCEDURE — 84460 ALANINE AMINO (ALT) (SGPT): CPT | Performed by: NURSE PRACTITIONER

## 2020-02-19 PROCEDURE — 82977 ASSAY OF GGT: CPT | Performed by: NURSE PRACTITIONER

## 2020-02-19 PROCEDURE — 83010 ASSAY OF HAPTOGLOBIN QUANT: CPT | Performed by: NURSE PRACTITIONER

## 2020-02-19 PROCEDURE — 83883 ASSAY NEPHELOMETRY NOT SPEC: CPT | Performed by: NURSE PRACTITIONER

## 2020-02-19 PROCEDURE — 85027 COMPLETE CBC AUTOMATED: CPT | Performed by: NURSE PRACTITIONER

## 2020-02-19 PROCEDURE — 84478 ASSAY OF TRIGLYCERIDES: CPT | Performed by: NURSE PRACTITIONER

## 2020-02-19 PROCEDURE — 99214 OFFICE O/P EST MOD 30 MIN: CPT | Performed by: NURSE PRACTITIONER

## 2020-02-19 PROCEDURE — 82247 BILIRUBIN TOTAL: CPT | Performed by: NURSE PRACTITIONER

## 2020-02-19 PROCEDURE — 82947 ASSAY GLUCOSE BLOOD QUANT: CPT | Performed by: NURSE PRACTITIONER

## 2020-02-19 PROCEDURE — 36415 COLL VENOUS BLD VENIPUNCTURE: CPT | Performed by: NURSE PRACTITIONER

## 2020-02-19 PROCEDURE — 84450 TRANSFERASE (AST) (SGOT): CPT | Performed by: NURSE PRACTITIONER

## 2020-02-19 RX ORDER — LORATADINE 10 MG/1
10 TABLET ORAL DAILY
COMMUNITY

## 2020-02-19 RX ORDER — FLUTICASONE PROPIONATE 50 MCG
2 SPRAY, SUSPENSION (ML) NASAL DAILY
COMMUNITY

## 2020-02-19 RX ORDER — OMEPRAZOLE 40 MG/1
40 CAPSULE, DELAYED RELEASE ORAL DAILY
Qty: 30 CAPSULE | Refills: 5 | Status: SHIPPED | OUTPATIENT
Start: 2020-02-19

## 2020-02-19 NOTE — PROGRESS NOTES
"Chief Complaint   Patient presents with   • Irritable Bowel Syndrome   • Heartburn       Subjective    Reya Florence Collins is a 30 y.o. female. she is here today for follow-up.    History of Present Illness  30-year-old female presents for follow-up regarding fatty liver, irritable bowel syndrome and GERD.  States she still has frequent episodes of abdominal pain and \"liver pain\".  Liver enzymes remain elevated.  States she is trying to lose weight but her weight is actually up 11 pounds from last office visit here.  Reports Prilosec does control her reflux and states that has not been as bad with medication.  Takes Zofran as needed for nausea.  She still reports some intermittent issues with your bowel syndrome but ports it has been better overall       The following portions of the patient's history were reviewed and updated as appropriate:   Past Medical History:   Diagnosis Date   • Adjustment disorder with depressed mood     Adjustment disorder with depressed mood - on celexa and 1 week risperdal   • Costal chondritis    • Cough     Cough - improved   • Hand eczema    • Influenza    • Not vaccinated against influenza 2016    INFLUENZA IMMUN NOT ADMIN, REASON NOT DOC  (1) - CHERI GRANADOS ([facility])    • Rib pain    • Stress     Difficulty managing stress   • Upper respiratory infection      Past Surgical History:   Procedure Laterality Date   •  SECTION       Section (2)   • CHOLECYSTECTOMY      Cholecystectomy (1)   • CHOLECYSTECTOMY      Cholecystectomy, laparoscopic (1)   • COLONOSCOPY     • COLONOSCOPY N/A 2019    Procedure: COLONOSCOPY;  Surgeon: Obed Vieira MD;  Location: [facility] ENDOSCOPY;  Service: Gastroenterology   • CYSTOSCOPY      Cystoscopy (1)   • CYSTOSCOPY      Cystoscopy & treatment (1)   • DENTAL PROCEDURE      Dental surgery procedure (1) - wisdom teeth   • ENDOSCOPY N/A 2019    Procedure: ESOPHAGOGASTRODUODENOSCOPY;  Surgeon: Obed Vieira MD;  " Location: [facility] ENDOSCOPY;  Service: Gastroenterology     Family History   Problem Relation Age of Onset   • ADD / ADHD Mother    • Diabetes Mother    • Anxiety disorder Mother    • ADD / ADHD Brother    • Heart murmur Brother    • Diabetes Maternal Grandmother    • Depression Other         Depressive disorder   • Heart disease Other    • Retinitis pigmentosa Other    • Arrhythmia Other    • Breast cancer Neg Hx    • Colon cancer Neg Hx         Colorectal cancer   • Endometrial cancer Neg Hx    • Ovarian cancer Neg Hx      OB History    None       Prior to Admission medications    Medication Sig Start Date End Date Taking? Authorizing Provider   aspirin 81 MG EC tablet Take 81 mg by mouth Daily.   Yes Emergency, Nurse Epic, RN   fluticasone (FLONASE) 50 MCG/ACT nasal spray 2 sprays into the nostril(s) as directed by provider Daily.   Yes ProviderLing MD   hydrocortisone (ANUSOL-HC) 2.5 % rectal cream Insert  into the rectum 2 (Two) Times a Day As Needed for Hemorrhoids (rectal discomfort). 9/24/19  Yes Katia Gibbs APRN   ketorolac (TORADOL) 10 MG tablet Take 1 tablet by mouth Every 6 (Six) Hours As Needed for Moderate Pain . 12/2/19  Yes Lio Renee MD   levonorgestrel (MIRENA, 52 MG,) 20 MCG/24HR IUD 1 each by Intrauterine route 1 (one) time.   Yes ProviderLing MD   loratadine (CLARITIN) 10 MG tablet Take 10 mg by mouth Daily.   Yes ProviderLing MD   Multiple Vitamin (MULTI VITAMIN DAILY PO) Take 1 tablet by mouth Daily.   Yes ProviderLing MD   omeprazole (priLOSEC) 40 MG capsule Take 1 capsule by mouth Daily. 2/19/20  Yes Katia Gibbs APRN   ondansetron ODT (ZOFRAN-ODT) 4 MG disintegrating tablet Take 1 tablet by mouth Every 6 (Six) Hours As Needed for Nausea or Vomiting. 12/2/19  Yes Lio Renee MD   Probiotic Product (SOLUBLE FIBER/PROBIOTICS PO) Take  by mouth.   Yes ProviderLing MD   promethazine (PHENERGAN) 25 MG suppository Insert 1  "suppository into the rectum Every 6 (Six) Hours As Needed for Nausea or Vomiting. 12/2/19  Yes Lio Renee MD   sertraline (ZOLOFT) 50 MG tablet Take 50 mg by mouth Daily.   Yes Emergency, Nurse Demarco RN   omeprazole (priLOSEC) 40 MG capsule Take 1 capsule by mouth Daily. 11/19/19 2/19/20 Yes Katia Gibbs APRN     No Known Allergies  Social History     Socioeconomic History   • Marital status: Legally      Spouse name: Not on file   • Number of children: Not on file   • Years of education: Not on file   • Highest education level: Not on file   Tobacco Use   • Smoking status: Never Smoker   • Smokeless tobacco: Never Used   Substance and Sexual Activity   • Alcohol use: Yes     Comment: rare   • Drug use: No   • Sexual activity: Yes     Partners: Male     Birth control/protection: Implant     Comment:    Social History Narrative    ** Merged History Encounter **            Review of Systems  Review of Systems   Constitutional: Positive for appetite change (no appetite ). Negative for activity change, chills, diaphoresis, fatigue, fever and unexpected weight change.   HENT: Negative for sore throat and trouble swallowing.    Respiratory: Negative for shortness of breath.    Gastrointestinal: Positive for abdominal pain and nausea. Negative for abdominal distention, anal bleeding, blood in stool, constipation, diarrhea, rectal pain and vomiting.   Musculoskeletal: Negative for arthralgias.   Skin: Negative for pallor.   Neurological: Negative for light-headedness.        /82 (BP Location: Left arm)   Pulse 84   Ht 149.9 cm (59\")   Wt 73.8 kg (162 lb 12.8 oz)   BMI 32.88 kg/m²     Objective    Physical Exam   Constitutional: She is oriented to person, place, and time. She appears well-developed and well-nourished. She is cooperative. No distress.   HENT:   Head: Normocephalic and atraumatic.   Neck: Normal range of motion. Neck supple. No thyromegaly present.   Cardiovascular: Normal " rate, regular rhythm and normal heart sounds.   Pulmonary/Chest: Effort normal and breath sounds normal. She has no wheezes. She has no rhonchi. She has no rales.   Abdominal: Soft. Normal appearance and bowel sounds are normal. She exhibits no distension. There is no hepatosplenomegaly. There is tenderness in the right upper quadrant. There is no rigidity and no guarding. No hernia.   Lymphadenopathy:     She has no cervical adenopathy.   Neurological: She is alert and oriented to person, place, and time.   Skin: Skin is warm, dry and intact. No rash noted. No pallor.   Psychiatric: She has a normal mood and affect. Her speech is normal.     Admission on 12/02/2019, Discharged on 12/02/2019   Component Date Value Ref Range Status   • Glucose 12/02/2019 78  65 - 99 mg/dL Final   • BUN 12/02/2019 8  6 - 20 mg/dL Final   • Creatinine 12/02/2019 0.59  0.57 - 1.00 mg/dL Final   • Sodium 12/02/2019 138  136 - 145 mmol/L Final   • Potassium 12/02/2019 3.8  3.5 - 5.2 mmol/L Final   • Chloride 12/02/2019 101  98 - 107 mmol/L Final   • CO2 12/02/2019 25.0  22.0 - 29.0 mmol/L Final   • Calcium 12/02/2019 9.1  8.6 - 10.5 mg/dL Final   • Total Protein 12/02/2019 7.1  6.0 - 8.5 g/dL Final   • Albumin 12/02/2019 4.30  3.50 - 5.20 g/dL Final   • ALT (SGPT) 12/02/2019 45* 1 - 33 U/L Final   • AST (SGOT) 12/02/2019 33* 1 - 32 U/L Final   • Alkaline Phosphatase 12/02/2019 46  39 - 117 U/L Final   • Total Bilirubin 12/02/2019 0.4  0.2 - 1.2 mg/dL Final   • eGFR Non African Amer 12/02/2019 121  >60 mL/min/1.73 Final   • Globulin 12/02/2019 2.8  gm/dL Final   • A/G Ratio 12/02/2019 1.5  g/dL Final   • BUN/Creatinine Ratio 12/02/2019 13.6  7.0 - 25.0 Final   • Anion Gap 12/02/2019 12.0  5.0 - 15.0 mmol/L Final   • Lipase 12/02/2019 21  13 - 60 U/L Final   • Color, UA 12/02/2019 Yellow  Yellow, Straw, Dark Yellow, Nadja Final   • Appearance,  12/02/2019 Clear  Clear Final   • pH,  12/02/2019 7.5  5.0 - 9.0 Final   • Specific Gravity,  UA 12/02/2019 1.012  1.003 - 1.030 Final   • Glucose, UA 12/02/2019 Negative  Negative Final   • Ketones, UA 12/02/2019 Trace* Negative Final   • Bilirubin, UA 12/02/2019 Negative  Negative Final   • Blood, UA 12/02/2019 Negative  Negative Final   • Protein, UA 12/02/2019 Negative  Negative Final   • Leuk Esterase, UA 12/02/2019 Negative  Negative Final   • Nitrite, UA 12/02/2019 Negative  Negative Final   • Urobilinogen, UA 12/02/2019 0.2 E.U./dL  0.2 - 1.0 E.U./dL Final   • Extra Tube 12/02/2019 hold for add-on   Final    Auto resulted   • Extra Tube 12/02/2019 Hold for add-ons.   Final    Auto resulted.   • Extra Tube 12/02/2019 hold for add-on   Final    Auto resulted   • Extra Tube 12/02/2019 Hold for add-ons.   Final    Auto resulted.   • WBC 12/02/2019 6.34  3.40 - 10.80 10*3/mm3 Final   • RBC 12/02/2019 4.31  3.77 - 5.28 10*6/mm3 Final   • Hemoglobin 12/02/2019 12.8  12.0 - 15.9 g/dL Final   • Hematocrit 12/02/2019 38.6  34.0 - 46.6 % Final   • MCV 12/02/2019 89.6  79.0 - 97.0 fL Final   • MCH 12/02/2019 29.7  26.6 - 33.0 pg Final   • MCHC 12/02/2019 33.2  31.5 - 35.7 g/dL Final   • RDW 12/02/2019 12.1* 12.3 - 15.4 % Final   • RDW-SD 12/02/2019 39.7  37.0 - 54.0 fl Final   • MPV 12/02/2019 10.0  6.0 - 12.0 fL Final   • Platelets 12/02/2019 309  140 - 450 10*3/mm3 Final   • Neutrophil % 12/02/2019 56.4  42.7 - 76.0 % Final   • Lymphocyte % 12/02/2019 29.8  19.6 - 45.3 % Final   • Monocyte % 12/02/2019 8.8  5.0 - 12.0 % Final   • Eosinophil % 12/02/2019 3.9  0.3 - 6.2 % Final   • Basophil % 12/02/2019 0.8  0.0 - 1.5 % Final   • Immature Grans % 12/02/2019 0.3  0.0 - 0.5 % Final   • Neutrophils, Absolute 12/02/2019 3.57  1.70 - 7.00 10*3/mm3 Final   • Lymphocytes, Absolute 12/02/2019 1.89  0.70 - 3.10 10*3/mm3 Final   • Monocytes, Absolute 12/02/2019 0.56  0.10 - 0.90 10*3/mm3 Final   • Eosinophils, Absolute 12/02/2019 0.25  0.00 - 0.40 10*3/mm3 Final   • Basophils, Absolute 12/02/2019 0.05  0.00 - 0.20  10*3/mm3 Final   • Immature Grans, Absolute 12/02/2019 0.02  0.00 - 0.05 10*3/mm3 Final   • nRBC 12/02/2019 0.0  0.0 - 0.2 /100 WBC Final   • HCG Qualitative 12/02/2019 Negative  Negative Final   • Chlamydia DNA by PCR 12/02/2019 Negative  Negative Final   • Neisseria gonorrhoeae by PCR 12/02/2019 Negative  Negative Final   • Trichomonas vaginalis PCR 12/02/2019 Negative   Final     Assessment/Plan      1. Fatty liver    2. Gastroesophageal reflux disease with esophagitis    3. Upper abdominal pain    .   Will obtain MRI of the liver and bile ducts due to continual elevation of transaminases.  Will obtain Rivera fibro-sure recommend weight loss.    Continue PPI daily, avoidance of gastric irritants and follow standard antireflux measures      Orders placed during this encounter include:  Orders Placed This Encounter   Procedures   • MRI abdomen w wo contrast mrcp     Standing Status:   Future     Standing Expiration Date:   2/19/2021     Order Specific Question:   Patient Pregnant     Answer:   No     Order Specific Question:   What is the patient's sedation requirement?     Answer:   No Sedation   • CBC (No Diff)   • Protime-INR   • RIVERA Fibrosure   • Comprehensive Metabolic Panel       * Surgery not found *    Review and/or summary of lab tests, radiology, procedures, medications. Review and summary of old records and obtaining of history. The risks and benefits of my recommendations, as well as other treatment options were discussed with the patient today. Questions were answered.    New Medications Ordered This Visit   Medications   • omeprazole (priLOSEC) 40 MG capsule     Sig: Take 1 capsule by mouth Daily.     Dispense:  30 capsule     Refill:  5       Follow-up: Return in about 4 weeks (around 3/18/2020) for After test.          This document has been electronically signed by CHANDNI Murray on February 21, 2020 10:43 AM             Results for orders placed or performed in visit on 02/19/20   Protime-INR    Result Value Ref Range    Protime 12.8 11.1 - 15.3 Seconds    INR 0.98 0.80 - 1.20   CBC (No Diff)   Result Value Ref Range    WBC 6.83 3.40 - 10.80 10*3/mm3    RBC 4.50 3.77 - 5.28 10*6/mm3    Hemoglobin 13.4 12.0 - 15.9 g/dL    Hematocrit 41.4 34.0 - 46.6 %    MCV 92.0 79.0 - 97.0 fL    MCH 29.8 26.6 - 33.0 pg    MCHC 32.4 31.5 - 35.7 g/dL    RDW 13.0 12.3 - 15.4 %    RDW-SD 43.5 37.0 - 54.0 fl    MPV 9.5 6.0 - 12.0 fL    Platelets 496 (H) 140 - 450 10*3/mm3   Comprehensive Metabolic Panel   Result Value Ref Range    Glucose 72 65 - 99 mg/dL    BUN 14 6 - 20 mg/dL    Creatinine 0.54 (L) 0.57 - 1.00 mg/dL    Sodium 141 136 - 145 mmol/L    Potassium 4.3 3.5 - 5.2 mmol/L    Chloride 99 98 - 107 mmol/L    CO2 27.3 22.0 - 29.0 mmol/L    Calcium 9.1 8.6 - 10.5 mg/dL    Total Protein 7.2 6.0 - 8.5 g/dL    Albumin 4.40 3.50 - 5.20 g/dL    ALT (SGPT) 45 (H) 1 - 33 U/L    AST (SGOT) 39 (H) 1 - 32 U/L    Alkaline Phosphatase 66 39 - 117 U/L    Total Bilirubin 0.3 0.2 - 1.2 mg/dL    eGFR Non African Amer 133 >60 mL/min/1.73    Globulin 2.8 gm/dL    A/G Ratio 1.6 g/dL    BUN/Creatinine Ratio 25.9 (H) 7.0 - 25.0    Anion Gap 14.7 5.0 - 15.0 mmol/L   Results for orders placed or performed during the hospital encounter of 12/02/19   Gold Top - SST   Result Value Ref Range    Extra Tube Hold for add-ons.    Green Top (Gel)   Result Value Ref Range    Extra Tube Hold for add-ons.    Urinalysis With Microscopic If Indicated (No Culture) - Urine, Clean Catch   Result Value Ref Range    Color, UA Yellow Yellow, Straw, Dark Yellow, Nadja    Appearance, UA Clear Clear    pH, UA 7.5 5.0 - 9.0    Specific Gravity, UA 1.012 1.003 - 1.030    Glucose, UA Negative Negative    Ketones, UA Trace (A) Negative    Bilirubin, UA Negative Negative    Blood, UA Negative Negative    Protein, UA Negative Negative    Leuk Esterase, UA Negative Negative    Nitrite, UA Negative Negative    Urobilinogen, UA 0.2 E.U./dL 0.2 - 1.0 E.U./dL   Chlamydia  trachomatis, Neisseria gonorrhoeae, Trichomonas vaginalis, PCR - Swab, Vagina   Result Value Ref Range    Chlamydia DNA by PCR Negative Negative    Neisseria gonorrhoeae by PCR Negative Negative    Trichomonas vaginalis PCR Negative    CBC Auto Differential   Result Value Ref Range    WBC 6.34 3.40 - 10.80 10*3/mm3    RBC 4.31 3.77 - 5.28 10*6/mm3    Hemoglobin 12.8 12.0 - 15.9 g/dL    Hematocrit 38.6 34.0 - 46.6 %    MCV 89.6 79.0 - 97.0 fL    MCH 29.7 26.6 - 33.0 pg    MCHC 33.2 31.5 - 35.7 g/dL    RDW 12.1 (L) 12.3 - 15.4 %    RDW-SD 39.7 37.0 - 54.0 fl    MPV 10.0 6.0 - 12.0 fL    Platelets 309 140 - 450 10*3/mm3    Neutrophil % 56.4 42.7 - 76.0 %    Lymphocyte % 29.8 19.6 - 45.3 %    Monocyte % 8.8 5.0 - 12.0 %    Eosinophil % 3.9 0.3 - 6.2 %    Basophil % 0.8 0.0 - 1.5 %    Immature Grans % 0.3 0.0 - 0.5 %    Neutrophils, Absolute 3.57 1.70 - 7.00 10*3/mm3    Lymphocytes, Absolute 1.89 0.70 - 3.10 10*3/mm3    Monocytes, Absolute 0.56 0.10 - 0.90 10*3/mm3    Eosinophils, Absolute 0.25 0.00 - 0.40 10*3/mm3    Basophils, Absolute 0.05 0.00 - 0.20 10*3/mm3    Immature Grans, Absolute 0.02 0.00 - 0.05 10*3/mm3    nRBC 0.0 0.0 - 0.2 /100 WBC   Lavender Top   Result Value Ref Range    Extra Tube hold for add-on    Light Blue Top   Result Value Ref Range    Extra Tube hold for add-on    hCG, Serum, Qualitative   Result Value Ref Range    HCG Qualitative Negative Negative   Lipase   Result Value Ref Range    Lipase 21 13 - 60 U/L   Comprehensive Metabolic Panel   Result Value Ref Range    Glucose 78 65 - 99 mg/dL    BUN 8 6 - 20 mg/dL    Creatinine 0.59 0.57 - 1.00 mg/dL    Sodium 138 136 - 145 mmol/L    Potassium 3.8 3.5 - 5.2 mmol/L    Chloride 101 98 - 107 mmol/L    CO2 25.0 22.0 - 29.0 mmol/L    Calcium 9.1 8.6 - 10.5 mg/dL    Total Protein 7.1 6.0 - 8.5 g/dL    Albumin 4.30 3.50 - 5.20 g/dL    ALT (SGPT) 45 (H) 1 - 33 U/L    AST (SGOT) 33 (H) 1 - 32 U/L    Alkaline Phosphatase 46 39 - 117 U/L    Total Bilirubin  0.4 0.2 - 1.2 mg/dL    eGFR Non African Amer 121 >60 mL/min/1.73    Globulin 2.8 gm/dL    A/G Ratio 1.5 g/dL    BUN/Creatinine Ratio 13.6 7.0 - 25.0    Anion Gap 12.0 5.0 - 15.0 mmol/L   Results for orders placed or performed during the hospital encounter of 10/14/19   Gold Top - SST   Result Value Ref Range    Extra Tube Hold for add-ons.    Green Top (Gel)   Result Value Ref Range    Extra Tube Hold for add-ons.    CBC Auto Differential   Result Value Ref Range    WBC 7.18 3.40 - 10.80 10*3/mm3    RBC 4.39 3.77 - 5.28 10*6/mm3    Hemoglobin 13.2 12.0 - 15.9 g/dL    Hematocrit 40.0 34.0 - 46.6 %    MCV 91.1 79.0 - 97.0 fL    MCH 30.1 26.6 - 33.0 pg    MCHC 33.0 31.5 - 35.7 g/dL    RDW 12.5 12.3 - 15.4 %    RDW-SD 41.2 37.0 - 54.0 fl    MPV 9.8 6.0 - 12.0 fL    Platelets 326 140 - 450 10*3/mm3    Neutrophil % 64.8 42.7 - 76.0 %    Lymphocyte % 25.6 19.6 - 45.3 %    Monocyte % 6.8 5.0 - 12.0 %    Eosinophil % 1.9 0.3 - 6.2 %    Basophil % 0.6 0.0 - 1.5 %    Immature Grans % 0.3 0.0 - 0.5 %    Neutrophils, Absolute 4.65 1.70 - 7.00 10*3/mm3    Lymphocytes, Absolute 1.84 0.70 - 3.10 10*3/mm3    Monocytes, Absolute 0.49 0.10 - 0.90 10*3/mm3    Eosinophils, Absolute 0.14 0.00 - 0.40 10*3/mm3    Basophils, Absolute 0.04 0.00 - 0.20 10*3/mm3    Immature Grans, Absolute 0.02 0.00 - 0.05 10*3/mm3    nRBC 0.0 0.0 - 0.2 /100 WBC     *Note: Due to a large number of results and/or encounters for the requested time period, some results have not been displayed. A complete set of results can be found in Results Review.

## 2020-02-19 NOTE — PATIENT INSTRUCTIONS
Heartburn  Heartburn is a type of pain or discomfort that can happen in the throat or chest. It is often described as a burning pain. It may also cause a bad, acid-like taste in the mouth. Heartburn may feel worse when you lie down or bend over. It may be worse at night. It may be caused by stomach contents that move back up (reflux) into the tube that connects the mouth with the stomach (esophagus).  Follow these instructions at home:  Eating and drinking    · Avoid certain foods and drinks as told by your doctor. This may include:  ? Coffee and tea (with or without caffeine).  ? Drinks that have alcohol.  ? Energy drinks and sports drinks.  ? Carbonated drinks or sodas.  ? Chocolate and cocoa.  ? Peppermint and mint flavorings.  ? Garlic and onions.  ? Horseradish.  ? Spicy and acidic foods, such as:  § Peppers.  § Chili powder and daugherty powder.  § Vinegar.  § Hot sauces and BBQ sauce.  ? Citrus fruit juices and citrus fruits, such as:  § Oranges.  § Amor.  § Limes.  ? Tomato-based foods, such as:  § Red sauce and pizza with red sauce.  § Chili.  § Salsa.  ? Fried and fatty foods, such as:  § Donuts.  § French fries and potato chips.  § High-fat dressings.  ? High-fat meats, such as:  § Hot dogs and sausage.  § Rib eye steak.  § Ham and martinez.  ? High-fat dairy items, such as:  § Whole milk.  § Butter.  § Cream cheese.  · Eat small meals often. Avoid eating large meals.  · Avoid drinking large amounts of liquid with your meals.  · Avoid eating meals during the 2-3 hours before bedtime.  · Avoid lying down right after you eat.  · Do not exercise right after you eat.  Lifestyle         · If you are overweight, lose an amount of weight that is healthy for you. Ask your doctor about a safe weight loss goal.  · Do not use any products that contain nicotine or tobacco, including cigarettes, e-cigarettes, and chewing tobacco. These can make your symptoms worse. If you need help quitting, ask your doctor.  · Wear loose  clothes. Do not wear anything tight around your waist.  · Raise (elevate) the head of your bed about 6 inches (15 cm) when you sleep.  · Try to lower your stress. If you need help doing this, ask your doctor.  General instructions  · Pay attention to any changes in your symptoms.  · Take over-the-counter and prescription medicines only as told by your doctor.  ? Do not take aspirin, ibuprofen, or other NSAIDs unless your doctor says it is okay.  ? Stop medicines only as told by your doctor.  · Keep all follow-up visits as told by your doctor. This is important.  Contact a doctor if:  · You have new symptoms.  · You lose weight and you do not know why it is happening.  · You have trouble swallowing, or it hurts to swallow.  · You have wheezing or a cough that keeps happening.  · Your symptoms do not get better with treatment.  · You have heartburn often for more than 2 weeks.  Get help right away if:  · You have pain in your arms, neck, jaw, teeth, or back.  · You feel sweaty, dizzy, or light-headed.  · You have chest pain or shortness of breath.  · You throw up (vomit) and your throw up looks like blood or coffee grounds.  · Your poop (stool) is bloody or black.  These symptoms may represent a serious problem that is an emergency. Do not wait to see if the symptoms will go away. Get medical help right away. Call your local emergency services (911 in the [place] Do not drive yourself to the hospital.  Summary  · Heartburn is a type of pain that can happen in the throat or chest. It can feel like a burning pain. It may also cause a bad, acid-like taste in the mouth.  · You may need to avoid certain foods and drinks to help your symptoms. Ask your doctor what foods and drinks you should avoid.  · Take over-the-counter and prescription medicines only as told by your doctor. Do not take aspirin, ibuprofen, or other NSAIDs unless your doctor told you to do so.  · Contact your doctor if your symptoms do not get better or  they get worse.  This information is not intended to replace advice given to you by your health care provider. Make sure you discuss any questions you have with your health care provider.  Document Released: 08/29/2012 Document Revised: 05/20/2019 Document Reviewed: 05/20/2019  Elsevier Interactive Patient Education © 2020 [employer]

## 2020-02-21 ENCOUNTER — APPOINTMENT (OUTPATIENT)
Dept: MRI IMAGING | Facility: HOSPITAL | Age: 30
End: 2020-02-21

## 2020-02-22 LAB
A2 MACROGLOB SERPL-MCNC: 209 MG/DL (ref 110–276)
ALT SERPL W P-5'-P-CCNC: 47 IU/L (ref 0–40)
APO A-I SERPL-MCNC: 152 MG/DL (ref 116–209)
AST SERPL W P-5'-P-CCNC: 42 IU/L (ref 0–40)
BILIRUB SERPL-MCNC: 0.3 MG/DL (ref 0–1.2)
CHOLEST SERPL-MCNC: 229 MG/DL (ref 100–199)
FIBROSIS SCORING:: ABNORMAL
FIBROSIS STAGE SERPL QL: ABNORMAL
GGT SERPL-CCNC: 33 IU/L (ref 0–60)
GLUCOSE SERPL-MCNC: 79 MG/DL (ref 65–99)
HAPTOGLOB SERPL-MCNC: 204 MG/DL (ref 33–278)
INTERPRETATIONS: (REFERENCE): ABNORMAL
LABORATORY COMMENT REPORT: ABNORMAL
LIMITATIONS: (REFERENCE): ABNORMAL
LIVER FIBR SCORE SERPL CALC.FIBROSURE: 0.06 (ref 0–0.21)
NASH GRADE (REFERENCE): ABNORMAL
NASH SCORE (REFERENCE): 0.5
NASH SCORING (REFERENCE): ABNORMAL
STEATOSIS GRADE (REFERENCE): ABNORMAL
STEATOSIS GRADING (REFERENCE): ABNORMAL
STEATOSIS SCORE (REFERENCE): 0.54 (ref 0–0.3)
TRIGL SERPL-MCNC: 141 MG/DL (ref 0–149)
WEIGHT: (REFERENCE): 162 LBS

## 2020-04-01 ENCOUNTER — HOSPITAL ENCOUNTER (EMERGENCY)
Facility: HOSPITAL | Age: 30
Discharge: PSYCHIATRIC OR SUBSTANCE USE FACILITY | End: 2020-04-01
Attending: EMERGENCY MEDICINE

## 2020-04-01 ENCOUNTER — HOSPITAL ENCOUNTER (INPATIENT)
Facility: HOSPITAL | Age: 30
LOS: 4 days | Discharge: HOME | End: 2020-04-05
Attending: PSYCHIATRY & NEUROLOGY | Admitting: PSYCHIATRY & NEUROLOGY

## 2020-04-01 VITALS
HEIGHT: 59 IN | DIASTOLIC BLOOD PRESSURE: 77 MMHG | TEMPERATURE: 98 F | WEIGHT: 165 LBS | OXYGEN SATURATION: 99 % | SYSTOLIC BLOOD PRESSURE: 114 MMHG | RESPIRATION RATE: 18 BRPM | BODY MASS INDEX: 33.26 KG/M2 | HEART RATE: 69 BPM

## 2020-04-01 DIAGNOSIS — R45.851 SUICIDAL IDEATION: Primary | ICD-10-CM

## 2020-04-01 LAB
ALBUMIN SERPL-MCNC: 4.4 G/DL (ref 3.5–5.2)
ALBUMIN/GLOB SERPL: 1.5 G/DL
ALP SERPL-CCNC: 56 U/L (ref 39–117)
ALT SERPL W P-5'-P-CCNC: 40 U/L (ref 1–33)
AMORPH URATE CRY URNS QL MICRO: ABNORMAL /HPF
AMPHET+METHAMPHET UR QL: NEGATIVE
AMPHETAMINES UR QL: NEGATIVE
ANION GAP SERPL CALCULATED.3IONS-SCNC: 12 MMOL/L (ref 5–15)
APAP SERPL-MCNC: <5 MCG/ML (ref 10–30)
AST SERPL-CCNC: 32 U/L (ref 1–32)
BACTERIA UR QL AUTO: ABNORMAL /HPF
BARBITURATES UR QL SCN: NEGATIVE
BASOPHILS # BLD AUTO: 0.06 10*3/MM3 (ref 0–0.2)
BASOPHILS NFR BLD AUTO: 0.7 % (ref 0–1.5)
BENZODIAZ UR QL SCN: NEGATIVE
BILIRUB SERPL-MCNC: 0.3 MG/DL (ref 0.2–1.2)
BILIRUB UR QL STRIP: NEGATIVE
BUN BLD-MCNC: 8 MG/DL (ref 6–20)
BUN/CREAT SERPL: 11.8 (ref 7–25)
BUPRENORPHINE SERPL-MCNC: NEGATIVE NG/ML
CALCIUM SPEC-SCNC: 9.2 MG/DL (ref 8.6–10.5)
CANNABINOIDS SERPL QL: NEGATIVE
CHLORIDE SERPL-SCNC: 103 MMOL/L (ref 98–107)
CLARITY UR: ABNORMAL
CO2 SERPL-SCNC: 24 MMOL/L (ref 22–29)
COCAINE UR QL: NEGATIVE
COLOR UR: YELLOW
CREAT BLD-MCNC: 0.68 MG/DL (ref 0.57–1)
DEPRECATED RDW RBC AUTO: 42.5 FL (ref 37–54)
EOSINOPHIL # BLD AUTO: 0.16 10*3/MM3 (ref 0–0.4)
EOSINOPHIL NFR BLD AUTO: 1.9 % (ref 0.3–6.2)
ERYTHROCYTE [DISTWIDTH] IN BLOOD BY AUTOMATED COUNT: 12.8 % (ref 12.3–15.4)
ETHANOL BLD-MCNC: <10 MG/DL (ref 0–10)
ETHANOL UR QL: <0.01 %
GFR SERPL CREATININE-BSD FRML MDRD: 102 ML/MIN/1.73
GLOBULIN UR ELPH-MCNC: 2.9 GM/DL
GLUCOSE BLD-MCNC: 80 MG/DL (ref 65–99)
GLUCOSE UR STRIP-MCNC: NEGATIVE MG/DL
HCG SERPL QL: NEGATIVE
HCT VFR BLD AUTO: 41.5 % (ref 34–46.6)
HGB BLD-MCNC: 13.6 G/DL (ref 12–15.9)
HGB UR QL STRIP.AUTO: NEGATIVE
HOLD SPECIMEN: NORMAL
HOLD SPECIMEN: NORMAL
HYALINE CASTS UR QL AUTO: ABNORMAL /LPF
IMM GRANULOCYTES # BLD AUTO: 0.02 10*3/MM3 (ref 0–0.05)
IMM GRANULOCYTES NFR BLD AUTO: 0.2 % (ref 0–0.5)
KETONES UR QL STRIP: NEGATIVE
LEUKOCYTE ESTERASE UR QL STRIP.AUTO: ABNORMAL
LYMPHOCYTES # BLD AUTO: 1.78 10*3/MM3 (ref 0.7–3.1)
LYMPHOCYTES NFR BLD AUTO: 20.9 % (ref 19.6–45.3)
MCH RBC QN AUTO: 30 PG (ref 26.6–33)
MCHC RBC AUTO-ENTMCNC: 32.8 G/DL (ref 31.5–35.7)
MCV RBC AUTO: 91.6 FL (ref 79–97)
METHADONE UR QL SCN: NEGATIVE
MONOCYTES # BLD AUTO: 0.54 10*3/MM3 (ref 0.1–0.9)
MONOCYTES NFR BLD AUTO: 6.4 % (ref 5–12)
NEUTROPHILS # BLD AUTO: 5.94 10*3/MM3 (ref 1.7–7)
NEUTROPHILS NFR BLD AUTO: 69.9 % (ref 42.7–76)
NITRITE UR QL STRIP: NEGATIVE
NRBC BLD AUTO-RTO: 0 /100 WBC (ref 0–0.2)
OPIATES UR QL: NEGATIVE
OXYCODONE UR QL SCN: NEGATIVE
PCP UR QL SCN: NEGATIVE
PH UR STRIP.AUTO: 5.5 [PH] (ref 5–9)
PLATELET # BLD AUTO: 472 10*3/MM3 (ref 140–450)
PMV BLD AUTO: 9.3 FL (ref 6–12)
POTASSIUM BLD-SCNC: 4.2 MMOL/L (ref 3.5–5.2)
PROPOXYPH UR QL: NEGATIVE
PROT SERPL-MCNC: 7.3 G/DL (ref 6–8.5)
PROT UR QL STRIP: NEGATIVE
RBC # BLD AUTO: 4.53 10*6/MM3 (ref 3.77–5.28)
RBC # UR: ABNORMAL /HPF
REF LAB TEST METHOD: ABNORMAL
SALICYLATES SERPL-MCNC: <0.3 MG/DL
SODIUM BLD-SCNC: 139 MMOL/L (ref 136–145)
SP GR UR STRIP: 1.01 (ref 1–1.03)
SQUAMOUS #/AREA URNS HPF: ABNORMAL /HPF
TRICYCLICS UR QL SCN: NEGATIVE
UROBILINOGEN UR QL STRIP: ABNORMAL
WBC NRBC COR # BLD: 8.5 10*3/MM3 (ref 3.4–10.8)
WBC UR QL AUTO: ABNORMAL /HPF
WHOLE BLOOD HOLD SPECIMEN: NORMAL
WHOLE BLOOD HOLD SPECIMEN: NORMAL
YEAST URNS QL MICRO: ABNORMAL /HPF

## 2020-04-01 PROCEDURE — 84703 CHORIONIC GONADOTROPIN ASSAY: CPT | Performed by: EMERGENCY MEDICINE

## 2020-04-01 PROCEDURE — 82306 VITAMIN D 25 HYDROXY: CPT | Performed by: PSYCHIATRY & NEUROLOGY

## 2020-04-01 PROCEDURE — 99285 EMERGENCY DEPT VISIT HI MDM: CPT

## 2020-04-01 PROCEDURE — 80307 DRUG TEST PRSMV CHEM ANLYZR: CPT | Performed by: EMERGENCY MEDICINE

## 2020-04-01 PROCEDURE — 81001 URINALYSIS AUTO W/SCOPE: CPT | Performed by: STUDENT IN AN ORGANIZED HEALTH CARE EDUCATION/TRAINING PROGRAM

## 2020-04-01 PROCEDURE — 82607 VITAMIN B-12: CPT | Performed by: PSYCHIATRY & NEUROLOGY

## 2020-04-01 PROCEDURE — 80053 COMPREHEN METABOLIC PANEL: CPT | Performed by: EMERGENCY MEDICINE

## 2020-04-01 PROCEDURE — 85025 COMPLETE CBC W/AUTO DIFF WBC: CPT | Performed by: EMERGENCY MEDICINE

## 2020-04-01 RX ORDER — ACETAMINOPHEN 325 MG/1
650 TABLET ORAL EVERY 4 HOURS PRN
Status: DISCONTINUED | OUTPATIENT
Start: 2020-04-01 | End: 2020-04-05 | Stop reason: HOSPADM

## 2020-04-01 RX ORDER — LOPERAMIDE HYDROCHLORIDE 2 MG/1
2 CAPSULE ORAL
Status: DISCONTINUED | OUTPATIENT
Start: 2020-04-01 | End: 2020-04-05 | Stop reason: HOSPADM

## 2020-04-01 RX ORDER — TRAZODONE HYDROCHLORIDE 50 MG/1
50 TABLET ORAL NIGHTLY PRN
Status: CANCELLED | OUTPATIENT
Start: 2020-04-01

## 2020-04-01 RX ORDER — HYDROXYZINE PAMOATE 25 MG/1
50 CAPSULE ORAL EVERY 6 HOURS PRN
Status: CANCELLED | OUTPATIENT
Start: 2020-04-01

## 2020-04-01 RX ORDER — ALUMINA, MAGNESIA, AND SIMETHICONE 2400; 2400; 240 MG/30ML; MG/30ML; MG/30ML
15 SUSPENSION ORAL EVERY 6 HOURS PRN
Status: DISCONTINUED | OUTPATIENT
Start: 2020-04-01 | End: 2020-04-05 | Stop reason: HOSPADM

## 2020-04-01 RX ORDER — LOPERAMIDE HYDROCHLORIDE 2 MG/1
2 CAPSULE ORAL
Status: CANCELLED | OUTPATIENT
Start: 2020-04-01

## 2020-04-01 RX ORDER — ALUMINA, MAGNESIA, AND SIMETHICONE 2400; 2400; 240 MG/30ML; MG/30ML; MG/30ML
15 SUSPENSION ORAL EVERY 6 HOURS PRN
Status: CANCELLED | OUTPATIENT
Start: 2020-04-01

## 2020-04-01 RX ORDER — ACETAMINOPHEN 325 MG/1
650 TABLET ORAL EVERY 4 HOURS PRN
Status: CANCELLED | OUTPATIENT
Start: 2020-04-01

## 2020-04-01 RX ORDER — CLONIDINE HYDROCHLORIDE 0.1 MG/1
0.1 TABLET ORAL EVERY 4 HOURS PRN
Status: CANCELLED | OUTPATIENT
Start: 2020-04-01

## 2020-04-01 RX ORDER — HYDROXYZINE PAMOATE 50 MG/1
50 CAPSULE ORAL EVERY 6 HOURS PRN
Status: DISCONTINUED | OUTPATIENT
Start: 2020-04-01 | End: 2020-04-05 | Stop reason: HOSPADM

## 2020-04-01 RX ORDER — TRAZODONE HYDROCHLORIDE 50 MG/1
50 TABLET ORAL NIGHTLY PRN
Status: DISCONTINUED | OUTPATIENT
Start: 2020-04-01 | End: 2020-04-05 | Stop reason: HOSPADM

## 2020-04-01 RX ORDER — ACETAMINOPHEN 500 MG
1000 TABLET ORAL ONCE
Status: COMPLETED | OUTPATIENT
Start: 2020-04-01 | End: 2020-04-01

## 2020-04-01 RX ORDER — CLONIDINE HYDROCHLORIDE 0.1 MG/1
0.1 TABLET ORAL EVERY 4 HOURS PRN
Status: DISCONTINUED | OUTPATIENT
Start: 2020-04-01 | End: 2020-04-05 | Stop reason: HOSPADM

## 2020-04-01 RX ADMIN — ACETAMINOPHEN 1000 MG: 500 TABLET ORAL at 15:14

## 2020-04-01 RX ADMIN — Medication: at 21:03

## 2020-04-01 RX ADMIN — ACETAMINOPHEN 650 MG: 325 TABLET, FILM COATED ORAL at 20:14

## 2020-04-01 NOTE — ED NOTES
"This tech noticed that Pt was moving to sit in floor. Tech offered to get pt a bed. Pt stated \"No, I don't deserve one. I am not worthy of one.\" This tech informed Pt that everyone deserves a bed and I wouldn't care to get her one.\" Pt still refuses.      Carlos Garnett  04/01/20 [id number]    "

## 2020-04-01 NOTE — ED NOTES
Pt asking for a lice comb. Pt stating that she may have lice.      Carlos Garnett  04/01/20 [id number]
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
## 2020-04-01 NOTE — ED NOTES
Pt provided with a bed at this time. Pt still sitting in floor, but did acknowledge that we placed a bed in there.      Carlos Garnett  04/01/20 [id number]

## 2020-04-01 NOTE — ED NOTES
Francheska RN provided patient with yellow gown to change into at this time      Jessica Alvarado  04/01/20 [id number]

## 2020-04-01 NOTE — ED NOTES
Stephanie called out asking for medication for HA - this RN asked provider. Verbal order received.     Elizabeth Whittaker, RN  04/01/20 [id number]
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
## 2020-04-01 NOTE — ED NOTES
Pt sitting in bed at this time. Pt provided with another warm blanket. This tech is sitting Continuous 1:1 monitoring      Carlos Garnett  04/01/20 4219
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
## 2020-04-01 NOTE — ED NOTES
"This tech asked pt if she would like us to get her a bed, Pt still stating \"No, thank you, I don't need a bed. I'm fine\"      Carlos Garnett  04/01/20 1526       Carlos Garnett  04/01/20 1526    "

## 2020-04-01 NOTE — ED NOTES
Pt was asked again if we could get her a bed. Pt still refusing.      Carlos Garnett  04/01/20 1524       Carlos Garnett  04/01/20 1524

## 2020-04-01 NOTE — ED NOTES
This tech is sitting 1:1 with pt. Pt currently sleeping at this time.      Carlos Garnett  04/01/20 [id number]

## 2020-04-01 NOTE — NURSING NOTE
Review of Current Symptoms--only current symptoms        · General   NONE    · Eyes    glasses/contact lens    · ENT/Mouth    None    · Cardio    None    · Resp    None    · GI     None    ·     None    · MS    None    · Skin/Hair/Nails    Head lice    · Neuro    None

## 2020-04-02 PROBLEM — F43.10 POST TRAUMATIC STRESS DISORDER (PTSD): Status: ACTIVE | Noted: 2020-04-02

## 2020-04-02 PROBLEM — F60.3 BORDERLINE PERSONALITY DISORDER (HCC): Status: ACTIVE | Noted: 2020-04-02

## 2020-04-02 PROBLEM — F32.A DEPRESSIVE DISORDER: Status: ACTIVE | Noted: 2020-04-02

## 2020-04-02 LAB
CHOLEST SERPL-MCNC: 181 MG/DL (ref 0–200)
GLUCOSE P FAST SERPL-MCNC: 92 MG/DL (ref 65–99)
HDLC SERPL-MCNC: 47 MG/DL (ref 40–60)
LDLC SERPL CALC-MCNC: 110 MG/DL (ref 0–100)
LDLC/HDLC SERPL: 2.34 {RATIO}
TRIGL SERPL-MCNC: 119 MG/DL (ref 0–150)
TSH SERPL DL<=0.05 MIU/L-ACNC: 1.01 UIU/ML (ref 0.27–4.2)
VLDLC SERPL-MCNC: 23.8 MG/DL

## 2020-04-02 PROCEDURE — 80061 LIPID PANEL: CPT | Performed by: PSYCHIATRY & NEUROLOGY

## 2020-04-02 PROCEDURE — 99223 1ST HOSP IP/OBS HIGH 75: CPT | Performed by: PSYCHIATRY & NEUROLOGY

## 2020-04-02 PROCEDURE — 93010 ELECTROCARDIOGRAM REPORT: CPT | Performed by: INTERNAL MEDICINE

## 2020-04-02 PROCEDURE — 82947 ASSAY GLUCOSE BLOOD QUANT: CPT | Performed by: PSYCHIATRY & NEUROLOGY

## 2020-04-02 PROCEDURE — 99232 SBSQ HOSP IP/OBS MODERATE 35: CPT | Performed by: FAMILY MEDICINE

## 2020-04-02 PROCEDURE — 84443 ASSAY THYROID STIM HORMONE: CPT | Performed by: PSYCHIATRY & NEUROLOGY

## 2020-04-02 PROCEDURE — 93005 ELECTROCARDIOGRAM TRACING: CPT | Performed by: FAMILY MEDICINE

## 2020-04-02 PROCEDURE — 90833 PSYTX W PT W E/M 30 MIN: CPT | Performed by: PSYCHIATRY & NEUROLOGY

## 2020-04-02 PROCEDURE — 63710000001 ONDANSETRON PER 8 MG: Performed by: PSYCHIATRY & NEUROLOGY

## 2020-04-02 RX ORDER — ONDANSETRON 4 MG/1
4 TABLET, FILM COATED ORAL EVERY 6 HOURS PRN
Status: DISCONTINUED | OUTPATIENT
Start: 2020-04-02 | End: 2020-04-05 | Stop reason: HOSPADM

## 2020-04-02 RX ORDER — ASPIRIN 81 MG/1
81 TABLET ORAL DAILY
Status: DISCONTINUED | OUTPATIENT
Start: 2020-04-02 | End: 2020-04-05 | Stop reason: HOSPADM

## 2020-04-02 RX ORDER — TOPIRAMATE 50 MG/1
50 TABLET, FILM COATED ORAL EVERY 12 HOURS SCHEDULED
Status: DISCONTINUED | OUTPATIENT
Start: 2020-04-02 | End: 2020-04-05 | Stop reason: HOSPADM

## 2020-04-02 RX ORDER — PANTOPRAZOLE SODIUM 40 MG/1
40 TABLET, DELAYED RELEASE ORAL EVERY MORNING
Status: DISCONTINUED | OUTPATIENT
Start: 2020-04-03 | End: 2020-04-05 | Stop reason: HOSPADM

## 2020-04-02 RX ORDER — ESCITALOPRAM OXALATE 10 MG/1
10 TABLET ORAL DAILY
Status: DISCONTINUED | OUTPATIENT
Start: 2020-04-03 | End: 2020-04-05 | Stop reason: HOSPADM

## 2020-04-02 RX ORDER — TOPIRAMATE 50 MG/1
50 TABLET, FILM COATED ORAL 2 TIMES DAILY
COMMUNITY

## 2020-04-02 RX ORDER — ESCITALOPRAM OXALATE 5 MG/1
5 TABLET ORAL DAILY
Status: COMPLETED | OUTPATIENT
Start: 2020-04-02 | End: 2020-04-02

## 2020-04-02 RX ADMIN — ESCITALOPRAM 5 MG: 5 TABLET, FILM COATED ORAL at 18:13

## 2020-04-02 RX ADMIN — ASPIRIN 81 MG: 81 TABLET, COATED ORAL at 18:13

## 2020-04-02 RX ADMIN — ONDANSETRON HYDROCHLORIDE 4 MG: 4 TABLET, FILM COATED ORAL at 09:15

## 2020-04-02 RX ADMIN — TOPIRAMATE 50 MG: 50 TABLET, FILM COATED ORAL at 21:07

## 2020-04-02 NOTE — CONSULTS
CHIEF COMPLAINT/REASON FOR VISIT:  Suicidal Ideation    HPI:  Patient presented to our ED with the above complaint on April 1 at around 1 PM.  She said this was because of negative interactions with her boyfriend and stated to the ED provider that she wishes she were dead.  This is been going on for about the last week and has not had a specific plan occurred to her yet but she has cut her wrists in the past.  She then later says she had thoughts of choking herself with a belt.  She arrived on the behavioral health unit on April 1 at around 6:30 PM.  She repeated the same story and was quiet and cooperative.  This morning she is quiet and cooperative with me.    PROBLEM LIST:  Patient Active Problem List    Diagnosis   • Suicide ideation [R45.851]   • Irritable bowel syndrome [K58.9]   • Thrombocytosis (CMS/HCC) [D47.3]   • Blood in stool [K92.1]   • Diarrhea [R19.7]   • Generalized abdominal pain [R10.84]   • Fatty liver [K76.0]         CURRENT MEDICATIONS:  Medications Prior to Admission   Medication Sig Dispense Refill Last Dose   • aspirin 81 MG EC tablet Take 81 mg by mouth Daily.   3/31/2020 at Unknown time   • levonorgestrel (MIRENA, 52 MG,) 20 MCG/24HR IUD 1 each by Intrauterine route 1 (one) time.   4/1/2020 at Unknown time   • omeprazole (priLOSEC) 40 MG capsule Take 1 capsule by mouth Daily. 30 capsule 5 4/1/2020 at Unknown time   • topiramate (TOPAMAX) 50 MG tablet Take 50 mg by mouth 2 (Two) Times a Day.   4/1/2020 at 0900   • fluticasone (FLONASE) 50 MCG/ACT nasal spray 2 sprays into the nostril(s) as directed by provider Daily.   Taking   • hydrocortisone (ANUSOL-HC) 2.5 % rectal cream Insert  into the rectum 2 (Two) Times a Day As Needed for Hemorrhoids (rectal discomfort). 30 g 5 Unknown at Unknown time   • ketorolac (TORADOL) 10 MG tablet Take 1 tablet by mouth Every 6 (Six) Hours As Needed for Moderate Pain . 15 tablet 0 Taking   • loratadine (CLARITIN) 10 MG tablet Take 10 mg by mouth Daily.    Taking   • Multiple Vitamin (MULTI VITAMIN DAILY PO) Take 1 tablet by mouth Daily.   Taking   • ondansetron ODT (ZOFRAN-ODT) 4 MG disintegrating tablet Take 1 tablet by mouth Every 6 (Six) Hours As Needed for Nausea or Vomiting. 10 tablet 0 Taking   • Probiotic Product (SOLUBLE FIBER/PROBIOTICS PO) Take  by mouth.   Unknown at Unknown time   • promethazine (PHENERGAN) 25 MG suppository Insert 1 suppository into the rectum Every 6 (Six) Hours As Needed for Nausea or Vomiting. 15 suppository 0 Taking   • sertraline (ZOLOFT) 50 MG tablet Take 50 mg by mouth Daily.   Unknown at Unknown time       ALLERGIES:  Patient has no known allergies.      PAST MEDICAL/SURGICAL HISTORY:  Past Medical History:   Diagnosis Date   • Adjustment disorder with depressed mood     Adjustment disorder with depressed mood - on celexa and 1 week risperdal   • Costal chondritis    • Cough     Cough - improved   • Hand eczema    • Influenza    • Not vaccinated against influenza 2016    INFLUENZA IMMUN NOT ADMIN, REASON NOT DOC  (1) - CHERI GRANADOS ([facility])    • Rib pain    • Stress     Difficulty managing stress   • Upper respiratory infection        Past Surgical History:   Procedure Laterality Date   •  SECTION       Section (2)   • CHOLECYSTECTOMY      Cholecystectomy (1)   • CHOLECYSTECTOMY      Cholecystectomy, laparoscopic (1)   • COLONOSCOPY     • COLONOSCOPY N/A 2019    Procedure: COLONOSCOPY;  Surgeon: Obed Vieira MD;  Location: [facility] ENDOSCOPY;  Service: Gastroenterology   • CYSTOSCOPY      Cystoscopy (1)   • CYSTOSCOPY      Cystoscopy & treatment (1)   • DENTAL PROCEDURE      Dental surgery procedure (1) - wisdom teeth   • ENDOSCOPY N/A 2019    Procedure: ESOPHAGOGASTRODUODENOSCOPY;  Surgeon: Obed Vieira MD;  Location: [facility] ENDOSCOPY;  Service: Gastroenterology       Review of Systems   Constitutional: Negative for activity change, appetite change, fatigue and fever.   HENT: Negative for  congestion, ear discharge, ear pain, facial swelling, hearing loss, nosebleeds, postnasal drip, rhinorrhea, sinus pressure, sore throat, tinnitus and trouble swallowing.    Eyes: Negative for pain, discharge and visual disturbance.   Respiratory: Negative for cough, shortness of breath and wheezing.    Cardiovascular: Negative for chest pain, palpitations and leg swelling.   Gastrointestinal: Positive for abdominal pain. Negative for blood in stool, constipation, diarrhea, nausea and vomiting.   Genitourinary: Negative for difficulty urinating, dyspareunia, dysuria, flank pain, frequency, hematuria, menstrual problem, pelvic pain, urgency, vaginal bleeding and vaginal discharge.   Musculoskeletal: Negative for arthralgias, back pain, joint swelling, myalgias and neck pain.   Skin: Negative for rash and wound.        Patient reports she has head lice and this was confirmed by an ED nurse   Neurological: Negative for dizziness, seizures, syncope, weakness, light-headedness and headaches.   Hematological: Negative for adenopathy.       Social History     Socioeconomic History   • Marital status: Legally      Spouse name: Not on file   • Number of children: Not on file   • Years of education: Not on file   • Highest education level: Not on file   Tobacco Use   • Smoking status: Never Smoker   • Smokeless tobacco: Never Used   Substance and Sexual Activity   • Alcohol use: Yes     Comment: rare   • Drug use: No   • Sexual activity: Yes     Partners: Male     Birth control/protection: Implant     Comment:    Social History Narrative    ** Merged History Encounter **            Family History   Problem Relation Age of Onset   • ADD / ADHD Mother    • Diabetes Mother    • Anxiety disorder Mother    • ADD / ADHD Brother    • Heart murmur Brother    • Diabetes Maternal Grandmother    • Depression Other         Depressive disorder   • Heart disease Other    • Retinitis pigmentosa Other    • Arrhythmia Other   "  • Breast cancer Neg Hx    • Colon cancer Neg Hx         Colorectal cancer   • Endometrial cancer Neg Hx    • Ovarian cancer Neg Hx              Objective     /72 (BP Location: Right arm, Patient Position: Lying)   Pulse 78   Temp 98.4 °F (36.9 °C) (Tympanic)   Resp 18   Ht 149.9 cm (59\")   Wt 74.8 kg (165 lb)   SpO2 98%   BMI 33.33 kg/m²     Physical Exam   Constitutional: She appears well-developed and well-nourished.   HENT:   Head: Normocephalic and atraumatic.   Eyes: Conjunctivae and EOM are normal.   Neck: Normal range of motion. Neck supple. No thyromegaly present.   Cardiovascular: Normal rate, regular rhythm and normal heart sounds. Exam reveals no gallop and no friction rub.   No murmur heard.  Pulmonary/Chest: Effort normal and breath sounds normal. No respiratory distress. She has no wheezes. She has no rales.   Abdominal: Soft. She exhibits no distension and no mass. There is tenderness. There is no rebound and no guarding.   Patient has 1+ left upper quadrant and 2+ right upper quadrant tenderness and the liver edge is palpable 1 cm below the right costal margin on deep inspiration and is 1+ tender.   Musculoskeletal: Normal range of motion.   Lymphadenopathy:     She has no cervical adenopathy.   Neurological: She is alert. She has normal strength. She displays no tremor and normal reflexes. No sensory deficit. She exhibits normal muscle tone. Coordination normal. She displays no Babinski's sign on the right side. She displays no Babinski's sign on the left side.   Reflex Scores:       Tricep reflexes are 2+ on the right side and 2+ on the left side.       Bicep reflexes are 2+ on the right side and 2+ on the left side.       Brachioradialis reflexes are 2+ on the right side and 2+ on the left side.       Patellar reflexes are 2+ on the right side and 2+ on the left side.       Achilles reflexes are 2+ on the right side and 2+ on the left side.  CN II: Visual fields intact  CN " III,IV,VI: extraocular movements intact  CN V: Masseter strength and sensation in all three divisions intact  CN VII: Smile and eyelid closure symmetrical  CN VIII: Hearing intact  CN IX and X: Voice and palate movement intact  CN XI: Shoulder shrug intact  CN XII: Tongue protrusion and movement intact   Skin: Skin is warm and dry. No rash noted. No erythema.   Nursing note and vitals reviewed.      Dystonia/Tardive Dyskinesia  Absent  Meningeal Signs  Absent    Diagnostic Studies  CBC, CMP,TSH, UDS, acetaminophen level, salicylate level, ethanol level, U/A all normal except    CMP is normal except for minimally elevated ALT of 40.  Serum pregnancy test is negative and CBC is normal except for increased platelets at 472,000 (they were 496,000 on February 19).  Urinalysis is contaminated with 13-20 epithelial cells.  Nitrites are negative and leukocytes are trace.  Ethanol less than 10, acetaminophen less than 5, and salicylate less than 0.3.  Urine drug screen is all negative.    EKG from this admission is pending.  EKG from October 14, [facility]Vent. Rate : 083 BPM     Atrial Rate : 083 BPM     P-R Int : 136 ms          QRS Dur : 082 ms      QT Int : 350 ms       P-R-T Axes : 031 021 022 degrees     QTc Int : 411 ms     Normal sinus rhythm with sinus arrhythmia  Nonspecific T wave abnormality  Abnormal ECG  When compared with ECG of 04-MAY-2019 19:40,  No significant change was [id number] shows:        MRI of the brain on December 23, 2019 shows:  IMPRESSION:  No evidence of intracranial hemorrhage, mass effect, or acute  infarction.     Subtle focus of hyperintense FLAIR signal right para midline  frontal lobe likely representing nonspecific gliosis or  Dysplasia.    Fibrosis score of the liver on February 19 is increased at 1.54.      Assessment/Plan       Chronic abdominal pain with known fatty liver and recent markers of beginning fibrosis.  In the short-term may consider changing to a different antidepressant from the  Zoloft to perhaps lessen abdominal pain and follow-up with GI as planned as an outpatient.  She has also been diagnosed as irritable bowel syndrome in the past.    GERD by history    Head lice by history, status post treatment.    Thrombocytosis, minimally elevated, chronic.      Continue Home Meds as ordered. Mental health and pain issues managed per psychiatry.  Further diagnostic studies or intervention based on hospital course.

## 2020-04-02 NOTE — PLAN OF CARE
Problem: Patient Care Overview  Goal: Plan of Care Review  Outcome: Ongoing (interventions implemented as appropriate)  Flowsheets (Taken 4/2/2020 1209)  Progress: no change  Plan of Care Reviewed With: patient  Patient Agreement with Plan of Care: agrees  Outcome Summary: Pt appears to be sleeping well this shift. Treatment to hair completed. Will continue to monitor.
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
## 2020-04-02 NOTE — PLAN OF CARE
Problem: Patient Care Overview  Goal: Plan of Care Review  Outcome: Ongoing (interventions implemented as appropriate)  Flowsheets (Taken 4/2/2020 1520)  Progress: improving  Plan of Care Reviewed With: patient  Patient Agreement with Plan of Care: unable to participate  Outcome Summary: Pt has been doing well this shift. Pt is calm and cooperative with staff. Pt is eager to learn coping skills and ways to understand emotions. Imformational handouts provided.
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
## 2020-04-03 LAB
25(OH)D3 SERPL-MCNC: 24.3 NG/ML (ref 30–100)
VIT B12 BLD-MCNC: 269 PG/ML (ref 211–946)

## 2020-04-03 PROCEDURE — 99232 SBSQ HOSP IP/OBS MODERATE 35: CPT | Performed by: PSYCHIATRY & NEUROLOGY

## 2020-04-03 RX ORDER — MELATONIN
2000 DAILY
Status: DISCONTINUED | OUTPATIENT
Start: 2020-04-03 | End: 2020-04-05 | Stop reason: HOSPADM

## 2020-04-03 RX ORDER — LANOLIN ALCOHOL/MO/W.PET/CERES
1000 CREAM (GRAM) TOPICAL DAILY
Status: DISCONTINUED | OUTPATIENT
Start: 2020-04-03 | End: 2020-04-05 | Stop reason: HOSPADM

## 2020-04-03 RX ADMIN — MELATONIN 2000 UNITS: at 17:55

## 2020-04-03 RX ADMIN — ESCITALOPRAM OXALATE 10 MG: 10 TABLET ORAL at 08:39

## 2020-04-03 RX ADMIN — ASPIRIN 81 MG: 81 TABLET, COATED ORAL at 08:39

## 2020-04-03 RX ADMIN — TOPIRAMATE 50 MG: 50 TABLET, FILM COATED ORAL at 08:39

## 2020-04-03 RX ADMIN — TOPIRAMATE 50 MG: 50 TABLET, FILM COATED ORAL at 20:31

## 2020-04-03 RX ADMIN — CYANOCOBALAMIN TAB 1000 MCG 1000 MCG: 1000 TAB at 17:55

## 2020-04-03 RX ADMIN — PANTOPRAZOLE SODIUM 40 MG: 40 TABLET, DELAYED RELEASE ORAL at 08:38
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
## 2020-04-03 NOTE — PLAN OF CARE
Problem: Patient Care Overview  Goal: Plan of Care Review  Outcome: Ongoing (interventions implemented as appropriate)  Flowsheets (Taken 4/3/2020 0259)  Progress: improving  Plan of Care Reviewed With: patient  Patient Agreement with Plan of Care: agrees  Outcome Summary: Pt said that she feels much better today after talking to everyone and she is ready to make the right decisions for herself and her son. Pt has appeared to sleep well this shift. Will continue to monitor.

## 2020-04-03 NOTE — PLAN OF CARE
Problem: Patient Care Overview  Goal: Plan of Care Review  Outcome: Ongoing (interventions implemented as appropriate)  Flowsheets (Taken 4/3/2020 1604)  Progress: improving  Plan of Care Reviewed With: patient  Patient Agreement with Plan of Care: agrees  Outcome Summary: Patient denies anxiey, depression, SI and HI. She interacts well with staff and peers.

## 2020-04-03 NOTE — NURSING NOTE
Behavior   Note any precipitants to event or behavior   Describe level and action of any aggressive behavior or speech and associated interventions.     Anxiety: Patient denies at this time  Depression: Patient denies at this time  Pain  0  AVH   no  S/I   no  Plan  no  H/I   no  Plan  no    Affect   euthymic/normal      Note: Patient in hallway on assessment. She is alert and oriented. She denies above behaviors and is cooperative with medications. Her affect is normal, she interacts very well with staff and peers. Encouraged open communication with staff. Patient is agreeable to make needs known. No acute S/S of distress.       Intervention    PRN medication utilized:  no    Instructed in medication usage and effects  Medications administered as ordered  Encouraged to verbalize needs      Response    Verbalized understanding   Did patient take medications as ordered yes   Did patient interact with assessment?  yes     Plan    Will monitor for safety  Will monitor every 15 minutes as ordered  Will evaluate and promote the plan of care    Last BM:  unknown date  (Please chart in I/O as well)

## 2020-04-03 NOTE — NURSING NOTE
Behavior   Note any precipitants to event or behavior   Describe level and action of any aggressive behavior or speech and associated interventions.     Anxiety: Patient denies at this time  Depression: Patient denies at this time  Pain  0  AVH   no  S/I   no  Plan  no  H/I   no  Plan  no    Affect   euthymic/normal      Note:Pt interviewed in hallway. Denies SI/HI/AVH. Pt states that she feels as if she is headed in the right direction. Takes medications as ordered. Interacts with staff and peers appropriately. Will continue to monitor.      Intervention    PRN medication utilized:  no    Instructed in medication usage and effects  Medications administered as ordered  Encouraged to verbalize needs      Response    Verbalized understanding   Did patient take medications as ordered yes   Did patient interact with assessment?  yes     Plan    Will monitor for safety  Will monitor every 15 minutes as ordered  Will evaluate and promote the plan of care    Last BM:  unknown date  (Please chart in I/O as well)

## 2020-04-03 NOTE — PLAN OF CARE
"  Problem: Overarching Goals (Adult)  Goal: Optimized Coping Skills in Response to Life Stressors  Outcome: Ongoing (interventions implemented as appropriate)  Note:   Met with patient and completed Recreation Therapy Assessment.  Patient has very poor boundaries and requires a lot of redirection, as she is intent on disclosing too much information about herself.  Patient makes many statements about how she is always ignoring her own self for others.  Patient states I am always \"giving of myself to others and going without, I will not even eat or take a shower\".  Patient states she was recently working at [employer].  She does not identify leisure interest  and spends leisure time watching TV.  She reports that writing in a journal and isolating herself, helps her to cope with stress.  Patient will be encouraged to attend group and identify ways  to spend time for effectively and build self-esteem.     "

## 2020-04-04 PROCEDURE — 99232 SBSQ HOSP IP/OBS MODERATE 35: CPT | Performed by: PSYCHIATRY & NEUROLOGY

## 2020-04-04 PROCEDURE — 90833 PSYTX W PT W E/M 30 MIN: CPT | Performed by: PSYCHIATRY & NEUROLOGY

## 2020-04-04 RX ADMIN — TOPIRAMATE 50 MG: 50 TABLET, FILM COATED ORAL at 20:27

## 2020-04-04 RX ADMIN — TOPIRAMATE 50 MG: 50 TABLET, FILM COATED ORAL at 08:09

## 2020-04-04 RX ADMIN — ASPIRIN 81 MG: 81 TABLET, COATED ORAL at 08:09

## 2020-04-04 RX ADMIN — ESCITALOPRAM OXALATE 10 MG: 10 TABLET ORAL at 08:09

## 2020-04-04 RX ADMIN — CYANOCOBALAMIN TAB 1000 MCG 1000 MCG: 1000 TAB at 08:09

## 2020-04-04 RX ADMIN — MELATONIN 2000 UNITS: at 08:09

## 2020-04-04 RX ADMIN — PANTOPRAZOLE SODIUM 40 MG: 40 TABLET, DELAYED RELEASE ORAL at 06:39
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
## 2020-04-04 NOTE — NURSING NOTE
Behavior   Note any precipitants to event or behavior   Describe level and action of any aggressive behavior or speech and associated interventions.     Anxiety: Patient denies at this time  Depression: Patient denies at this time  Pain  0  AVH   no  S/I   no  Plan  no  H/I   no  Plan  no    Affect   euthymic/normal      Note: Pt denies SI/HI/AVH this evening. Interacts appropriately with peers and is pleasant and compliant with medication and assessment.       Intervention    PRN medication utilized:  no    Instructed in medication usage and effects  Medications administered as ordered  Encouraged to verbalize needs      Response    Verbalized understanding   Did patient take medications as ordered yes   Did patient interact with assessment?  yes     Plan    Will monitor for safety  Will monitor every 15 minutes as ordered  Will evaluate and promote the plan of care    Last BM:  April 3, 2020  (Please chart in I/O as well)

## 2020-04-04 NOTE — PLAN OF CARE
Problem: Patient Care Overview  Goal: Plan of Care Review  Outcome: Ongoing (interventions implemented as appropriate)   Pt denies SI/HI/AVH this evening. Interacts appropriately with peers and is pleasant and compliant with medication and assessment.

## 2020-04-05 VITALS
HEART RATE: 91 BPM | WEIGHT: 165 LBS | HEIGHT: 59 IN | BODY MASS INDEX: 33.26 KG/M2 | TEMPERATURE: 98.2 F | OXYGEN SATURATION: 97 % | RESPIRATION RATE: 18 BRPM | SYSTOLIC BLOOD PRESSURE: 109 MMHG | DIASTOLIC BLOOD PRESSURE: 72 MMHG

## 2020-04-05 PROBLEM — R45.851 SUICIDE IDEATION: Status: RESOLVED | Noted: 2020-04-01 | Resolved: 2020-04-05

## 2020-04-05 PROCEDURE — 90833 PSYTX W PT W E/M 30 MIN: CPT | Performed by: PSYCHIATRY & NEUROLOGY

## 2020-04-05 PROCEDURE — 99238 HOSP IP/OBS DSCHRG MGMT 30/<: CPT | Performed by: PSYCHIATRY & NEUROLOGY

## 2020-04-05 RX ORDER — MELATONIN
2000 DAILY
Qty: 60 TABLET | Refills: 0 | Status: SHIPPED | OUTPATIENT
Start: 2020-04-06

## 2020-04-05 RX ORDER — ESCITALOPRAM OXALATE 10 MG/1
10 TABLET ORAL DAILY
Qty: 30 TABLET | Refills: 1 | Status: SHIPPED | OUTPATIENT
Start: 2020-04-06

## 2020-04-05 RX ADMIN — ASPIRIN 81 MG: 81 TABLET, COATED ORAL at 08:12

## 2020-04-05 RX ADMIN — MAGNESIUM HYDROXIDE 10 ML: 2400 SUSPENSION ORAL at 15:07

## 2020-04-05 RX ADMIN — MELATONIN 2000 UNITS: at 08:12

## 2020-04-05 RX ADMIN — TOPIRAMATE 50 MG: 50 TABLET, FILM COATED ORAL at 08:12

## 2020-04-05 RX ADMIN — PANTOPRAZOLE SODIUM 40 MG: 40 TABLET, DELAYED RELEASE ORAL at 06:27

## 2020-04-05 RX ADMIN — ESCITALOPRAM OXALATE 10 MG: 10 TABLET ORAL at 08:12

## 2020-04-05 RX ADMIN — CYANOCOBALAMIN TAB 1000 MCG 1000 MCG: 1000 TAB at 08:12

## 2020-04-05 NOTE — PLAN OF CARE
Problem: Patient Care Overview  Goal: Plan of Care Review  Outcome: Ongoing (interventions implemented as appropriate)  Flowsheets (Taken 4/5/2020 0388)  Progress: improving  Plan of Care Reviewed With: patient  Patient Agreement with Plan of Care: agrees  Outcome Summary: Allegra has slept apprx. 6 hours at this time. She spent most of evening coloring and journaling in her room. She denied SI/HI/AVH or physical complaint and has been calm, cooperative and compliant with medication.

## 2020-04-05 NOTE — NURSING NOTE
Behavior   Note any precipitants to event or behavior   Describe level and action of any aggressive behavior or speech and associated interventions.     Anxiety: Patient denies at this time  Depression: Patient denies at this time  Pain  0  AVH   no  S/I   no  Plan  no  H/I   no  Plan  no    Affect   mood-congruent      Note: Allegra denied SI/HI/AVH or physical complaints. She spends her time in her room coloring and/or journaling. She is calm and cooperative.      Intervention    PRN medication utilized:  no    Instructed in medication usage and effects  Medications administered as ordered  Encouraged to verbalize needs      Response    Verbalized understanding   Did patient take medications as ordered yes   Did patient interact with assessment?  yes     Plan    Will monitor for safety  Will monitor every 15 minutes as ordered  Will evaluate and promote the plan of care    Last BM:  04/04/2020  (Please chart in I/O as well)

## 2020-04-05 NOTE — NURSING NOTE
Behavior   Note any precipitants to event or behavior   Describe level and action of any aggressive behavior or speech and associated interventions.     Anxiety: Patient denies at this time  Depression: Patient denies at this time  Pain  0  AVH   no  S/I   no  Plan  no  H/I   no  Plan  no    Affect   euthymic/normal      Note:Patient sitting up in bed. Took medication as ordered Denies SI/HI/AVH. Pt calm, cooperative and talkative with staff.Will continue to monitor.       Intervention    PRN medication utilized:  no    Instructed in medication usage and effects  Medications administered as ordered  Encouraged to verbalize needs      Response    Verbalized understanding   Did patient take medications as ordered yes   Did patient interact with assessment?  yes     Plan    Will monitor for safety  Will monitor every 15 minutes as ordered  Will evaluate and promote the plan of care    Last BM:  April 4, 2020  (Please chart in I/O as well)
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
## 2022-08-05 ENCOUNTER — TRANSCRIBE ORDERS (OUTPATIENT)
Dept: ADMINISTRATIVE | Facility: HOSPITAL | Age: 32
End: 2022-08-05

## 2022-08-05 DIAGNOSIS — R14.0 BLOATING: Primary | ICD-10-CM

## 2022-08-25 ENCOUNTER — HOSPITAL ENCOUNTER (OUTPATIENT)
Dept: NUCLEAR MEDICINE | Facility: HOSPITAL | Age: 32
Discharge: HOME | End: 2022-08-25

## 2022-08-25 PROCEDURE — 78264 GASTRIC EMPTYING IMG STUDY: CPT

## 2022-08-25 PROCEDURE — A9541 TC99M SULFUR COLLOID: HCPCS | Performed by: NURSE PRACTITIONER

## 2022-08-25 PROCEDURE — 0 TECHNETIUM SULFUR COLLOID: Performed by: NURSE PRACTITIONER

## 2022-08-25 RX ADMIN — TECHNETIUM TC 99M SULFUR COLLOID 1 DOSE: KIT at 08:58

## (undated) DEVICE — SINGLE-USE BIOPSY FORCEPS: Brand: RADIAL JAW 4

## (undated) DEVICE — BITEBLOCK ENDO W/STRAP 60F A/ LF DISP